# Patient Record
Sex: MALE | Race: WHITE | Employment: OTHER | URBAN - METROPOLITAN AREA
[De-identification: names, ages, dates, MRNs, and addresses within clinical notes are randomized per-mention and may not be internally consistent; named-entity substitution may affect disease eponyms.]

---

## 2017-02-17 RX ORDER — LISINOPRIL 20 MG/1
20 TABLET ORAL
Qty: 30 TABLET | Refills: 1 | Status: SHIPPED | OUTPATIENT
Start: 2017-02-17 | End: 2017-04-17

## 2017-04-17 RX ORDER — LISINOPRIL 20 MG/1
20 TABLET ORAL
Qty: 90 TABLET | Refills: 0 | Status: SHIPPED | OUTPATIENT
Start: 2017-04-17 | End: 2017-07-17

## 2017-04-17 NOTE — TELEPHONE ENCOUNTER
Did not pass protocol pt due for OV would you like pt to schedule OV. Please advise. Per protocol routed to provider.

## 2017-04-20 ENCOUNTER — TELEPHONE (OUTPATIENT)
Dept: INTERNAL MEDICINE CLINIC | Facility: CLINIC | Age: 62
End: 2017-04-20

## 2017-05-08 RX ORDER — FAMOTIDINE 20 MG/1
20 TABLET ORAL 2 TIMES DAILY
Qty: 180 TABLET | Refills: 0 | Status: SHIPPED | OUTPATIENT
Start: 2017-05-08 | End: 2017-08-26

## 2017-05-17 RX ORDER — SIMVASTATIN 20 MG
TABLET ORAL
Qty: 90 TABLET | Refills: 0 | Status: SHIPPED | OUTPATIENT
Start: 2017-05-17 | End: 2017-08-22

## 2017-06-05 ENCOUNTER — TELEPHONE (OUTPATIENT)
Dept: INTERNAL MEDICINE CLINIC | Facility: CLINIC | Age: 62
End: 2017-06-05

## 2017-07-13 ENCOUNTER — PRIOR ORIGINAL RECORDS (OUTPATIENT)
Dept: OTHER | Age: 62
End: 2017-07-13

## 2017-07-14 LAB
ALKALINE PHOSPHATATE(ALK PHOS): 108 IU/L
BILIRUBIN TOTAL: 0.4 MG/DL
BUN: 20 MG/DL
CALCIUM: 9.5 MG/DL
CHLORIDE: 107 MEQ/L
CHOLESTEROL, TOTAL: 123 MG/DL
CREATININE, SERUM: 1.38 MG/DL
GLUCOSE: 86 MG/DL
HDL CHOLESTEROL: 43 MG/DL
LDL CHOLESTEROL: 56 MG/DL
POTASSIUM, SERUM: 4.3 MEQ/L
PROTEIN, TOTAL: 7.4 G/DL
SGOT (AST): 14 IU/L
SGPT (ALT): 14 IU/L
SODIUM: 142 MEQ/L
TRIGLYCERIDES: 121 MG/DL

## 2017-07-17 RX ORDER — LISINOPRIL 20 MG/1
20 TABLET ORAL
Qty: 90 TABLET | Refills: 0 | Status: SHIPPED | OUTPATIENT
Start: 2017-07-17 | End: 2017-10-16

## 2017-07-17 NOTE — TELEPHONE ENCOUNTER
Per protocol; failed - route to provider  Patient is due for repeat fasting labs, Last completed a year ago   Physical coming up 7/19/17 SD

## 2017-07-18 ENCOUNTER — TELEPHONE (OUTPATIENT)
Dept: INTERNAL MEDICINE CLINIC | Facility: CLINIC | Age: 62
End: 2017-07-18

## 2017-07-19 ENCOUNTER — OFFICE VISIT (OUTPATIENT)
Dept: INTERNAL MEDICINE CLINIC | Facility: CLINIC | Age: 62
End: 2017-07-19

## 2017-07-19 ENCOUNTER — LAB ENCOUNTER (OUTPATIENT)
Dept: LAB | Age: 62
End: 2017-07-19
Attending: NURSE PRACTITIONER
Payer: MEDICARE

## 2017-07-19 VITALS
HEART RATE: 64 BPM | SYSTOLIC BLOOD PRESSURE: 118 MMHG | BODY MASS INDEX: 36 KG/M2 | WEIGHT: 230 LBS | DIASTOLIC BLOOD PRESSURE: 70 MMHG

## 2017-07-19 DIAGNOSIS — I77.9 BILATERAL CAROTID ARTERY DISEASE (HCC): ICD-10-CM

## 2017-07-19 DIAGNOSIS — I69.30 HISTORY OF CVA WITH RESIDUAL DEFICIT: ICD-10-CM

## 2017-07-19 DIAGNOSIS — Z12.11 SCREEN FOR COLON CANCER: ICD-10-CM

## 2017-07-19 DIAGNOSIS — I73.9 PERIPHERAL VASCULAR DISEASE, UNSPECIFIED (HCC): ICD-10-CM

## 2017-07-19 DIAGNOSIS — D64.9 ANEMIA, UNSPECIFIED: ICD-10-CM

## 2017-07-19 DIAGNOSIS — Z87.39 HISTORY OF GOUT: ICD-10-CM

## 2017-07-19 DIAGNOSIS — Z79.01 CHRONIC ANTICOAGULATION: ICD-10-CM

## 2017-07-19 DIAGNOSIS — I10 ESSENTIAL HYPERTENSION: ICD-10-CM

## 2017-07-19 DIAGNOSIS — Z87.01 HISTORY OF PNEUMONIA: ICD-10-CM

## 2017-07-19 DIAGNOSIS — E78.00 PURE HYPERCHOLESTEROLEMIA: ICD-10-CM

## 2017-07-19 DIAGNOSIS — R47.01 APHASIA: ICD-10-CM

## 2017-07-19 DIAGNOSIS — Z00.00 ENCOUNTER FOR ANNUAL HEALTH EXAMINATION: Primary | ICD-10-CM

## 2017-07-19 DIAGNOSIS — M19.071 ARTHRITIS OF ANKLE, RIGHT: ICD-10-CM

## 2017-07-19 DIAGNOSIS — Z91.81 AT RISK FOR FALLING: ICD-10-CM

## 2017-07-19 DIAGNOSIS — Z86.79 HISTORY OF VENTRICULAR TACHYCARDIA: ICD-10-CM

## 2017-07-19 DIAGNOSIS — Q21.1 OSTIUM SECUNDUM TYPE ATRIAL SEPTAL DEFECT: ICD-10-CM

## 2017-07-19 DIAGNOSIS — F41.9 ANXIETY: ICD-10-CM

## 2017-07-19 DIAGNOSIS — N18.30 CKD (CHRONIC KIDNEY DISEASE) STAGE 3, GFR 30-59 ML/MIN (HCC): ICD-10-CM

## 2017-07-19 LAB
BASOPHILS # BLD AUTO: 0.04 X10(3) UL (ref 0–0.1)
BASOPHILS NFR BLD AUTO: 0.4 %
EOSINOPHIL # BLD AUTO: 0.24 X10(3) UL (ref 0–0.3)
EOSINOPHIL NFR BLD AUTO: 2.6 %
ERYTHROCYTE [DISTWIDTH] IN BLOOD BY AUTOMATED COUNT: 12.4 % (ref 11.5–16)
HCT VFR BLD AUTO: 43.5 % (ref 37–53)
HGB BLD-MCNC: 13.9 G/DL (ref 13–17)
IMMATURE GRANULOCYTE COUNT: 0.04 X10(3) UL (ref 0–1)
IMMATURE GRANULOCYTE RATIO %: 0.4 %
LYMPHOCYTES # BLD AUTO: 2.99 X10(3) UL (ref 0.9–4)
LYMPHOCYTES NFR BLD AUTO: 32.2 %
MCH RBC QN AUTO: 30 PG (ref 27–33.2)
MCHC RBC AUTO-ENTMCNC: 32 G/DL (ref 31–37)
MCV RBC AUTO: 93.8 FL (ref 80–99)
MONOCYTES # BLD AUTO: 0.78 X10(3) UL (ref 0.1–0.6)
MONOCYTES NFR BLD AUTO: 8.4 %
NEUTROPHIL ABS PRELIM: 5.19 X10 (3) UL (ref 1.3–6.7)
NEUTROPHILS # BLD AUTO: 5.19 X10(3) UL (ref 1.3–6.7)
NEUTROPHILS NFR BLD AUTO: 56 %
PLATELET # BLD AUTO: 231 10(3)UL (ref 150–450)
RBC # BLD AUTO: 4.64 X10(6)UL (ref 4.3–5.7)
RED CELL DISTRIBUTION WIDTH-SD: 43 FL (ref 35.1–46.3)
WBC # BLD AUTO: 9.3 X10(3) UL (ref 4–13)

## 2017-07-19 PROCEDURE — 99214 OFFICE O/P EST MOD 30 MIN: CPT | Performed by: NURSE PRACTITIONER

## 2017-07-19 PROCEDURE — 90732 PPSV23 VACC 2 YRS+ SUBQ/IM: CPT | Performed by: NURSE PRACTITIONER

## 2017-07-19 PROCEDURE — 85025 COMPLETE CBC W/AUTO DIFF WBC: CPT

## 2017-07-19 PROCEDURE — G0009 ADMIN PNEUMOCOCCAL VACCINE: HCPCS | Performed by: NURSE PRACTITIONER

## 2017-07-19 PROCEDURE — G0439 PPPS, SUBSEQ VISIT: HCPCS | Performed by: NURSE PRACTITIONER

## 2017-07-19 RX ORDER — CLOTRIMAZOLE AND BETAMETHASONE DIPROPIONATE 10; .64 MG/G; MG/G
1 CREAM TOPICAL 2 TIMES DAILY
Qty: 60 G | Refills: 3 | Status: SHIPPED | OUTPATIENT
Start: 2017-07-19 | End: 2018-08-09 | Stop reason: ALTCHOICE

## 2017-07-19 RX ORDER — ASPIRIN 81 MG/1
81 TABLET ORAL DAILY
COMMUNITY

## 2017-07-19 NOTE — PROGRESS NOTES
HPI:   Rosa Jameson is a 58year old male who presents for a Medicare Subsequent Annual Wellness visit (Pt already had Initial Annual Wellness). He is doing very well today. Expressive.          Anemia, unspecified   Cbc not done with other labs from [facility])     Ostium secundum type atrial septal defect     Pure hypercholesterolemia     Essential hypertension     Chronic anticoagulation     History of ventricular tachycardia     At risk for falling     Aphasia     Arthritis of ankle, right     CKD (chron gain; Acute, but ill-defined, cerebrovascular disease (4/2009, 11/2008); CVA, old, aphasia (4/2009); Edema; Essential hypertension; Factor 5 Leiden mutation, [facility] HTN (hypertension); Hypercholesteremia;  Hyperlipidemia; PFO (patent foramen ov Symmetric, no curvature, ROM normal, no CVA tenderness   Lungs:   Clear to auscultation bilaterally, respirations unlabored   Chest Wall:  No tenderness or deformity   Heart:  Regular rate and rhythm, S1, S2 normal,    Abdomen:   Soft, non-tender, bowel so ml/min  Stable. Creat 1.38 baseline. History of pneumonia   Pneumovax today    History of gout  Uric acid normal on recent labs. History of CVA with residual deficit   Stable. Bilateral carotid artery disease (HCC)  Stable  Cont statin. help    Taking medications as prescribed: Cannot do without help    Are you able to afford your medications?: Yes    Hearing Problems?: No     Functional Status     Hearing Problems?: No    Vision Problems? : No    Difficulty walking?: Yes    Difficulty  (mg/dL (calc))   Date Value   05/18/2016 53        EKG - w/ Initial Preventative Physical Exam only, or if medically necessary Electrocardiogram date10/12/2015    Colorectal Cancer Screening      Colonoscopy Screen every 10 years Colonoscopy,10 Years due o found for: A1C    No flowsheet data found. Creat/alb ratio  Annually      LDL  Annually LDL-CHOLESTEROL (mg/dL (calc))   Date Value   05/18/2016 53    No flowsheet data found. Dilated Eye exam  Annually No flowsheet data found.   No flowsheet data fo

## 2017-07-19 NOTE — PATIENT INSTRUCTIONS
Cathy Braun's SCREENING SCHEDULE   Tests on this list are recommended by your physician but may not be covered, or covered at this frequency, by your insurer. Please check with your insurance carrier before scheduling to verify coverage.     PREVENTATI years- more often if abnormal Colonoscopy,10 Years due on 06/13/2005 Update [facility] if applicable    Flex Sigmoidoscopy Screen  Covered every 5 years No results found for this or any previous visit. No flowsheet data found.      Fecal Occult Bloo DIPHTHERIA TOXOIDS AND ACELLULAR PERTUSIS VACCINE (TDAP), >7 YEARS, IM USE    This may be covered with your prescription benefits, but Medicare does not cover unless Medically needed    Zoster (Not covered by Medicare Part B) No orders found for this or an

## 2017-08-22 RX ORDER — SIMVASTATIN 20 MG
TABLET ORAL
Qty: 90 TABLET | Refills: 1 | Status: SHIPPED | OUTPATIENT
Start: 2017-08-22 | End: 2018-02-20

## 2017-08-25 ENCOUNTER — APPOINTMENT (OUTPATIENT)
Dept: LAB | Facility: HOSPITAL | Age: 62
End: 2017-08-25
Attending: NURSE PRACTITIONER
Payer: MEDICARE

## 2017-08-25 DIAGNOSIS — Z12.11 SCREEN FOR COLON CANCER: ICD-10-CM

## 2017-08-25 PROCEDURE — 82272 OCCULT BLD FECES 1-3 TESTS: CPT

## 2017-08-28 RX ORDER — FAMOTIDINE 20 MG/1
TABLET ORAL
Qty: 180 TABLET | Refills: 0 | Status: SHIPPED | OUTPATIENT
Start: 2017-08-28 | End: 2017-11-27

## 2017-09-06 ENCOUNTER — PRIOR ORIGINAL RECORDS (OUTPATIENT)
Dept: OTHER | Age: 62
End: 2017-09-06

## 2017-10-16 RX ORDER — LISINOPRIL 20 MG/1
20 TABLET ORAL
Qty: 90 TABLET | Refills: 1 | Status: SHIPPED | OUTPATIENT
Start: 2017-10-16 | End: 2018-04-14

## 2017-11-27 RX ORDER — FAMOTIDINE 20 MG/1
TABLET ORAL
Qty: 180 TABLET | Refills: 0 | Status: SHIPPED | OUTPATIENT
Start: 2017-11-27 | End: 2018-02-20

## 2018-02-20 RX ORDER — SIMVASTATIN 20 MG
TABLET ORAL
Qty: 90 TABLET | Refills: 1 | Status: SHIPPED | OUTPATIENT
Start: 2018-02-20 | End: 2018-08-16

## 2018-02-20 RX ORDER — FAMOTIDINE 20 MG/1
TABLET ORAL
Qty: 180 TABLET | Refills: 1 | Status: SHIPPED | OUTPATIENT
Start: 2018-02-20 | End: 2018-08-16

## 2018-02-20 NOTE — TELEPHONE ENCOUNTER
[facility] is calling to check the status of a refill on the following:  SIMVASTATIN 20 MG Oral Tab 90 tablet 1 8/22/2017    Sig :  TAKE ONE TABLET BY MOUTH ONE TIME DAILY IN THE EVENING.      Route:   (none)       And    FAMOTIDINE 20 MG Oral Tab 180 tablet 0 11/

## 2018-02-20 NOTE — TELEPHONE ENCOUNTER
Per protocol; failed - route to provider  Review for refill - due for office visit and repeat Lipid , ALT abnormal

## 2018-04-16 RX ORDER — LISINOPRIL 20 MG/1
20 TABLET ORAL
Qty: 90 TABLET | Refills: 1 | Status: SHIPPED | OUTPATIENT
Start: 2018-04-16 | End: 2018-10-11

## 2018-04-16 NOTE — TELEPHONE ENCOUNTER
Medication(s) to Refill:   Pending Prescriptions Disp Refills    LISINOPRIL 20 MG Oral Tab [Pharmacy Med Name: LISINOPRIL 20 MG TABLET] 90 tablet 0     Sig: TAKE 1 TABLET (20 MG TOTAL) BY MOUTH ONCE DAILY. Reason for Medication Refill being sent to Provider / Reason Protocol Failed:  [x] 90 day bobby period ended  [] Blood Pressure out of range  [x] Labs Abnormal  [] Medication not previously prescribed by Provider  [] Non-Protocol Medication  [] Prescription needs to be printed at site and faxed to pharmacy  [] Controlled Substance - needs to be printed at the site, site to notify patient when ready    Last Time Medication was Filled: 10/16/2017 (90 tab with 1 refill )       Last Office Visit with PCP: 7/19/2017    When Patient was Due Back to the Office: Return in about 1 year around 7/19/2018  (from when PCP last addressed condition)    Future Appointments:  No future appointments.       Last Blood Pressures:  BP Readings from Last 2 Encounters:  07/19/17 : 118/70  07/13/16 : 120/76

## 2018-04-23 ENCOUNTER — TELEPHONE (OUTPATIENT)
Dept: INTERNAL MEDICINE CLINIC | Facility: CLINIC | Age: 63
End: 2018-04-23

## 2018-05-11 ENCOUNTER — TELEPHONE (OUTPATIENT)
Dept: INTERNAL MEDICINE CLINIC | Facility: CLINIC | Age: 63
End: 2018-05-11

## 2018-05-14 NOTE — TELEPHONE ENCOUNTER
Forms has been filled out,patient still due to sign forms. Left voicemail to see if patient would prefer to  forms or mail to their home.

## 2018-05-21 ENCOUNTER — TELEPHONE (OUTPATIENT)
Dept: INTERNAL MEDICINE CLINIC | Facility: CLINIC | Age: 63
End: 2018-05-21

## 2018-07-13 ENCOUNTER — TELEPHONE (OUTPATIENT)
Dept: INTERNAL MEDICINE CLINIC | Facility: CLINIC | Age: 63
End: 2018-07-13

## 2018-07-13 NOTE — TELEPHONE ENCOUNTER
Spoke to [place] and she would like the forms filled out prior to his appt she states that in the paper work that she filled out she wrote that he has an appt in Aug 2018

## 2018-08-09 ENCOUNTER — OFFICE VISIT (OUTPATIENT)
Dept: INTERNAL MEDICINE CLINIC | Facility: CLINIC | Age: 63
End: 2018-08-09
Payer: MEDICARE

## 2018-08-09 VITALS
RESPIRATION RATE: 16 BRPM | WEIGHT: 235 LBS | HEART RATE: 68 BPM | DIASTOLIC BLOOD PRESSURE: 78 MMHG | SYSTOLIC BLOOD PRESSURE: 138 MMHG | BODY MASS INDEX: 37 KG/M2

## 2018-08-09 DIAGNOSIS — Z86.79 HISTORY OF VENTRICULAR TACHYCARDIA: ICD-10-CM

## 2018-08-09 DIAGNOSIS — Z12.5 SCREENING FOR PROSTATE CANCER: ICD-10-CM

## 2018-08-09 DIAGNOSIS — F41.9 ANXIETY: ICD-10-CM

## 2018-08-09 DIAGNOSIS — I69.30 HISTORY OF CVA WITH RESIDUAL DEFICIT: ICD-10-CM

## 2018-08-09 DIAGNOSIS — I73.9 PERIPHERAL VASCULAR DISEASE, UNSPECIFIED (HCC): ICD-10-CM

## 2018-08-09 DIAGNOSIS — Z87.39 HISTORY OF GOUT: ICD-10-CM

## 2018-08-09 DIAGNOSIS — Z00.00 ENCOUNTER FOR ANNUAL HEALTH EXAMINATION: Primary | ICD-10-CM

## 2018-08-09 DIAGNOSIS — Z79.01 CHRONIC ANTICOAGULATION: ICD-10-CM

## 2018-08-09 DIAGNOSIS — R60.0 BILATERAL LEG EDEMA: ICD-10-CM

## 2018-08-09 DIAGNOSIS — E78.00 PURE HYPERCHOLESTEROLEMIA: ICD-10-CM

## 2018-08-09 DIAGNOSIS — Z91.81 AT RISK FOR FALLING: ICD-10-CM

## 2018-08-09 DIAGNOSIS — R47.01 APHASIA: ICD-10-CM

## 2018-08-09 DIAGNOSIS — Q21.1 OSTIUM SECUNDUM TYPE ATRIAL SEPTAL DEFECT: ICD-10-CM

## 2018-08-09 DIAGNOSIS — D64.9 ANEMIA, UNSPECIFIED TYPE: ICD-10-CM

## 2018-08-09 DIAGNOSIS — N18.30 CKD (CHRONIC KIDNEY DISEASE) STAGE 3, GFR 30-59 ML/MIN (HCC): ICD-10-CM

## 2018-08-09 DIAGNOSIS — I65.23 BILATERAL CAROTID ARTERY STENOSIS: ICD-10-CM

## 2018-08-09 DIAGNOSIS — Z87.01 HISTORY OF PNEUMONIA: ICD-10-CM

## 2018-08-09 DIAGNOSIS — I10 ESSENTIAL HYPERTENSION: ICD-10-CM

## 2018-08-09 PROCEDURE — 99214 OFFICE O/P EST MOD 30 MIN: CPT | Performed by: NURSE PRACTITIONER

## 2018-08-09 PROCEDURE — G0439 PPPS, SUBSEQ VISIT: HCPCS | Performed by: NURSE PRACTITIONER

## 2018-08-09 RX ORDER — FUROSEMIDE 20 MG/1
20 TABLET ORAL DAILY
Qty: 5 TABLET | Refills: 0 | Status: SHIPPED | OUTPATIENT
Start: 2018-08-09 | End: 2019-09-12 | Stop reason: ALTCHOICE

## 2018-08-09 RX ORDER — POTASSIUM CHLORIDE 20 MEQ/1
20 TABLET, EXTENDED RELEASE ORAL DAILY
Qty: 5 TABLET | Refills: 0 | Status: SHIPPED | OUTPATIENT
Start: 2018-08-09 | End: 2019-10-16

## 2018-08-09 NOTE — PROGRESS NOTES
HPI:   Joce Oseguera is a 61year old male who presents for a Medicare Subsequent Annual Wellness visit (Pt already had Initial Annual Wellness). Anemia, unspecified type  Stable on most recenet cbc   Due for labs.    -     CBC WITH DIFFERENTIAL WITH increase seroquel.        His last annual assessment has been over 1 year: Annual Physical due on 07/19/2018          Fall/Risk Assessment   He has been screened for Falls and is low risk: Fall/Risk Scoring: 3    Cognitive Assessment          His results ar Advanced Directive:   He does NOT have a Living Will on file in [facility] [address] Not Discussed       He does have a POA but we do NOT have it on file in [facility] [address] Not Discussed           He smoked tobacco in the past but quit greater than 12 months ago.   Smok No Known Allergies. CURRENT MEDICATIONS:     Outpatient Prescriptions Marked as Taking for the 8/9/18 encounter (Office Visit) with SHERIF Joy:  furosemide (LASIX) 20 MG Oral Tab Take 1 tablet (20 mg total) by mouth daily.    Potassium Chloride E Negative for fever, chills and fatigue. No distress. HENT: Negative for hearing loss, congestion, sore throat, neck pain and dental problem. Eyes: Negative for pain and visual disturbance.    Respiratory: Negative for cough, chest tightness, shortness o Vaccination History     Immunization History   Administered Date(s) Administered   • Influenza 11/28/2012   • Pneumovax 23 07/19/2017   • TDAP 11/28/2012   • Technetium Tc99mm Sestamibi, Iv, Up To 40 Millicuries 55/98/0424        ASSESSMENT AND OTHER REL be worse with warmer weather. Will give a few doses of lasix   Family defer further work up   Will discuss further wth Dr Andrzej Chou if persistent. Other orders  -     furosemide (LASIX) 20 MG Oral Tab; Take 1 tablet (20 mg total) by mouth daily.   -     Po Visit Annually: Diabetics, FHx Glaucoma, AA>50, > 65 No flowsheet data found.     Prostate Cancer Screening      PSA  Annually PSA due on 10/05/2013  Update Health Maintenance if applicable     Immunizations (Update Immunization Activity if applicab

## 2018-08-09 NOTE — PATIENT INSTRUCTIONS
Jose Braun's SCREENING SCHEDULE   Tests on this list are recommended by your physician but may not be covered, or covered at this frequency, by your insurer. Please check with your insurance carrier before scheduling to verify coverage.     PREVENTATI years- more often if abnormal Colonoscopy due on 06/13/1955 Update [facility] if applicable    Flex Sigmoidoscopy Screen  Covered every 5 years No results found for this or any previous visit. No flowsheet data found.      Fecal Occult Blood   Cover Tetanus Toxoid- Only covered with a cut with metal- TD and TDaP Not covered by Medicare Part B)   Orders placed or performed in visit on 11/28/12  -TETANUS, DIPHTHERIA TOXOIDS AND ACELLULAR PERTUSIS VACCINE (TDAP), >7 YEARS, IM USE    This may be covered w

## 2018-08-16 RX ORDER — FAMOTIDINE 20 MG/1
TABLET, FILM COATED ORAL
Qty: 180 TABLET | Refills: 1 | Status: SHIPPED | OUTPATIENT
Start: 2018-08-16 | End: 2019-02-11

## 2018-08-16 RX ORDER — SIMVASTATIN 20 MG
TABLET ORAL
Qty: 90 TABLET | Refills: 0 | Status: SHIPPED | OUTPATIENT
Start: 2018-08-16 | End: 2018-11-23

## 2018-08-16 NOTE — TELEPHONE ENCOUNTER
Did not pass protocol-pt due for CMP and LIPID  Pt seen on 8/9/18 for CPE and labs were ordered  Per protocol routed to provider

## 2018-09-26 ENCOUNTER — LAB ENCOUNTER (OUTPATIENT)
Dept: LAB | Age: 63
End: 2018-09-26
Attending: NURSE PRACTITIONER
Payer: MEDICARE

## 2018-09-26 ENCOUNTER — PRIOR ORIGINAL RECORDS (OUTPATIENT)
Dept: OTHER | Age: 63
End: 2018-09-26

## 2018-09-26 DIAGNOSIS — E78.00 PURE HYPERCHOLESTEROLEMIA: ICD-10-CM

## 2018-09-26 DIAGNOSIS — D64.9 ANEMIA, UNSPECIFIED TYPE: ICD-10-CM

## 2018-09-26 DIAGNOSIS — I10 ESSENTIAL HYPERTENSION: ICD-10-CM

## 2018-09-26 DIAGNOSIS — Z12.5 SCREENING FOR PROSTATE CANCER: ICD-10-CM

## 2018-09-26 DIAGNOSIS — I63.9 IMPENDING CEREBROVASCULAR ACCIDENT (HCC): Primary | ICD-10-CM

## 2018-09-26 LAB
ALBUMIN SERPL-MCNC: 4.1 G/DL (ref 3.5–4.8)
ALBUMIN/GLOB SERPL: 1.1 {RATIO} (ref 1–2)
ALP LIVER SERPL-CCNC: 112 U/L (ref 45–117)
ALT SERPL-CCNC: 25 U/L (ref 17–63)
ANION GAP SERPL CALC-SCNC: 8 MMOL/L (ref 0–18)
AST SERPL-CCNC: 14 U/L (ref 15–41)
BASOPHILS # BLD AUTO: 0.04 X10(3) UL (ref 0–0.1)
BASOPHILS NFR BLD AUTO: 0.5 %
BILIRUB SERPL-MCNC: 0.5 MG/DL (ref 0.1–2)
BUN BLD-MCNC: 18 MG/DL (ref 8–20)
BUN/CREAT SERPL: 13.2 (ref 10–20)
CALCIUM BLD-MCNC: 9.3 MG/DL (ref 8.3–10.3)
CHLORIDE SERPL-SCNC: 104 MMOL/L (ref 101–111)
CHOLEST SMN-MCNC: 108 MG/DL (ref ?–200)
CO2 SERPL-SCNC: 27 MMOL/L (ref 22–32)
COMPLEXED PSA SERPL-MCNC: 4.08 NG/ML (ref 0.01–4)
CREAT BLD-MCNC: 1.36 MG/DL (ref 0.7–1.3)
EOSINOPHIL # BLD AUTO: 0.25 X10(3) UL (ref 0–0.3)
EOSINOPHIL NFR BLD AUTO: 3.1 %
ERYTHROCYTE [DISTWIDTH] IN BLOOD BY AUTOMATED COUNT: 12.5 % (ref 11.5–16)
GLOBULIN PLAS-MCNC: 3.7 G/DL (ref 2.5–4)
GLUCOSE BLD-MCNC: 84 MG/DL (ref 70–99)
HCT VFR BLD AUTO: 44.6 % (ref 37–53)
HDLC SERPL-MCNC: 38 MG/DL (ref 40–59)
HGB BLD-MCNC: 14.1 G/DL (ref 13–17)
IMMATURE GRANULOCYTE COUNT: 0.02 X10(3) UL (ref 0–1)
IMMATURE GRANULOCYTE RATIO %: 0.2 %
INR BLD: 2.04 (ref 0.9–1.1)
INR: 2.04
LDLC SERPL CALC-MCNC: 50 MG/DL (ref ?–100)
LYMPHOCYTES # BLD AUTO: 3.04 X10(3) UL (ref 0.9–4)
LYMPHOCYTES NFR BLD AUTO: 38 %
M PROTEIN MFR SERPL ELPH: 7.8 G/DL (ref 6.1–8.3)
MCH RBC QN AUTO: 30.1 PG (ref 27–33.2)
MCHC RBC AUTO-ENTMCNC: 31.6 G/DL (ref 31–37)
MCV RBC AUTO: 95.3 FL (ref 80–99)
MONOCYTES # BLD AUTO: 0.63 X10(3) UL (ref 0.1–1)
MONOCYTES NFR BLD AUTO: 7.9 %
NEUTROPHIL ABS PRELIM: 4.03 X10 (3) UL (ref 1.3–6.7)
NEUTROPHILS # BLD AUTO: 4.03 X10(3) UL (ref 1.3–6.7)
NEUTROPHILS NFR BLD AUTO: 50.3 %
NONHDLC SERPL-MCNC: 70 MG/DL (ref ?–130)
OSMOLALITY SERPL CALC.SUM OF ELEC: 289 MOSM/KG (ref 275–295)
PLATELET # BLD AUTO: 201 10(3)UL (ref 150–450)
POTASSIUM SERPL-SCNC: 4.2 MMOL/L (ref 3.6–5.1)
PSA SERPL DL<=0.01 NG/ML-MCNC: 23.7 SECONDS (ref 12.4–14.7)
RBC # BLD AUTO: 4.68 X10(6)UL (ref 4.3–5.7)
RED CELL DISTRIBUTION WIDTH-SD: 44.1 FL (ref 35.1–46.3)
SODIUM SERPL-SCNC: 139 MMOL/L (ref 136–144)
TRIGL SERPL-MCNC: 99 MG/DL (ref 30–149)
TSI SER-ACNC: 1.44 MIU/ML (ref 0.35–5.5)
VLDLC SERPL CALC-MCNC: 20 MG/DL (ref 0–30)
WBC # BLD AUTO: 8 X10(3) UL (ref 4–13)

## 2018-09-26 PROCEDURE — 85025 COMPLETE CBC W/AUTO DIFF WBC: CPT

## 2018-09-26 PROCEDURE — 80061 LIPID PANEL: CPT

## 2018-09-26 PROCEDURE — 36415 COLL VENOUS BLD VENIPUNCTURE: CPT

## 2018-09-26 PROCEDURE — 84443 ASSAY THYROID STIM HORMONE: CPT

## 2018-09-26 PROCEDURE — 85610 PROTHROMBIN TIME: CPT

## 2018-09-26 PROCEDURE — 80053 COMPREHEN METABOLIC PANEL: CPT

## 2018-09-27 ENCOUNTER — TELEPHONE (OUTPATIENT)
Dept: INTERNAL MEDICINE CLINIC | Facility: CLINIC | Age: 63
End: 2018-09-27

## 2018-09-27 DIAGNOSIS — R97.20 ELEVATED PSA: Primary | ICD-10-CM

## 2018-09-27 NOTE — TELEPHONE ENCOUNTER
Pt sister Kaushik Herrera calling back to discuss pt's lab results and recommendations. Keven Mario that patient's PSA elevated and SD recommends seeing Urology.  Kaushik Herrera agreeable states name written down was Dr. Romero Gilliland but prefer not to use [facility] and wanted recomme

## 2018-10-10 ENCOUNTER — PRIOR ORIGINAL RECORDS (OUTPATIENT)
Dept: OTHER | Age: 63
End: 2018-10-10

## 2018-10-10 ENCOUNTER — MYAURORA ACCOUNT LINK (OUTPATIENT)
Dept: OTHER | Age: 63
End: 2018-10-10

## 2018-10-10 LAB
ALBUMIN: 4.1 G/DL
ALKALINE PHOSPHATATE(ALK PHOS): 112 IU/L
ALT (SGPT): 25 U/L
AST (SGOT): 14 U/L
BILIRUBIN TOTAL: 0.5 MG/DL
BUN: 18 MG/DL
CALCIUM: 9.3 MG/DL
CHLORIDE: 104 MEQ/L
CHOLESTEROL, TOTAL: 108 MG/DL
CREATININE, SERUM: 1.36 MG/DL
GLUCOSE: 84 MG/DL
GLUCOSE: 84 MG/DL
HDL CHOLESTEROL: 38 MG/DL
HEMATOCRIT: 44.6 %
HEMOGLOBIN: 14.1 G/DL
LDL CHOLESTEROL: 50 MG/DL
MCH: 30.1 PG
MCHC: 31.6 G/DL
MCV: 95.3 FL
NON-HDL CHOLESTEROL: 70 MG/DL
PLATELETS: 201 K/UL
POTASSIUM, SERUM: 4.2 MEQ/L
PROTEIN, TOTAL: 7.8 G/DL
RED BLOOD COUNT: 4.68 X 10-6/U
SGOT (AST): 14 IU/L
SGPT (ALT): 25 IU/L
SODIUM: 139 MEQ/L
TRIGLYCERIDES: 99 MG/DL
WHITE BLOOD COUNT: 8 X 10-3/U

## 2018-10-11 RX ORDER — LISINOPRIL 20 MG/1
20 TABLET ORAL
Qty: 90 TABLET | Refills: 1 | Status: SHIPPED | OUTPATIENT
Start: 2018-10-11 | End: 2019-04-07

## 2018-10-24 ENCOUNTER — PRIOR ORIGINAL RECORDS (OUTPATIENT)
Dept: OTHER | Age: 63
End: 2018-10-24

## 2018-10-24 LAB — INR: 2.3

## 2018-11-01 ENCOUNTER — PRIOR ORIGINAL RECORDS (OUTPATIENT)
Dept: OTHER | Age: 63
End: 2018-11-01

## 2018-11-01 LAB — INR: 2

## 2018-11-02 ENCOUNTER — MED REC SCAN ONLY (OUTPATIENT)
Dept: INTERNAL MEDICINE CLINIC | Facility: CLINIC | Age: 63
End: 2018-11-02

## 2018-11-08 ENCOUNTER — PRIOR ORIGINAL RECORDS (OUTPATIENT)
Dept: OTHER | Age: 63
End: 2018-11-08

## 2018-11-08 LAB — INR: 2.3

## 2018-11-24 RX ORDER — SIMVASTATIN 20 MG
TABLET ORAL
Qty: 90 TABLET | Refills: 0 | Status: SHIPPED | OUTPATIENT
Start: 2018-11-24 | End: 2019-02-25

## 2018-12-05 ENCOUNTER — PRIOR ORIGINAL RECORDS (OUTPATIENT)
Dept: OTHER | Age: 63
End: 2018-12-05

## 2018-12-05 LAB — INR: 2.2

## 2018-12-18 ENCOUNTER — PRIOR ORIGINAL RECORDS (OUTPATIENT)
Dept: OTHER | Age: 63
End: 2018-12-18

## 2018-12-18 LAB — INR: 2.5

## 2018-12-31 ENCOUNTER — PRIOR ORIGINAL RECORDS (OUTPATIENT)
Dept: OTHER | Age: 63
End: 2018-12-31

## 2018-12-31 LAB — INR: 2.5

## 2019-01-09 ENCOUNTER — PRIOR ORIGINAL RECORDS (OUTPATIENT)
Dept: OTHER | Age: 64
End: 2019-01-09

## 2019-01-09 LAB — INR: 2.4

## 2019-01-10 ENCOUNTER — HOSPITAL ENCOUNTER (OUTPATIENT)
Age: 64
Discharge: HOME | End: 2019-01-10
Payer: MEDICARE

## 2019-01-10 VITALS
HEART RATE: 82 BPM | TEMPERATURE: 100 F | OXYGEN SATURATION: 98 % | DIASTOLIC BLOOD PRESSURE: 99 MMHG | SYSTOLIC BLOOD PRESSURE: 126 MMHG | RESPIRATION RATE: 20 BRPM

## 2019-01-10 DIAGNOSIS — L03.317 CELLULITIS OF BUTTOCK: Primary | ICD-10-CM

## 2019-01-10 PROCEDURE — 99204 OFFICE O/P NEW MOD 45 MIN: CPT

## 2019-01-10 PROCEDURE — 99213 OFFICE O/P EST LOW 20 MIN: CPT

## 2019-01-10 RX ORDER — PREDNISONE 10 MG/1
TABLET ORAL
Qty: 20 TABLET | Refills: 0 | Status: SHIPPED | OUTPATIENT
Start: 2019-01-10 | End: 2019-09-12 | Stop reason: ALTCHOICE

## 2019-01-10 RX ORDER — TRIAMTERENE AND HYDROCHLOROTHIAZIDE 37.5; 25 MG/1; MG/1
1 TABLET ORAL DAILY
COMMUNITY
End: 2019-10-16

## 2019-01-10 RX ORDER — CEFADROXIL 500 MG/1
500 CAPSULE ORAL 2 TIMES DAILY
Qty: 20 CAPSULE | Refills: 0 | Status: SHIPPED | OUTPATIENT
Start: 2019-01-10 | End: 2019-01-20

## 2019-01-10 NOTE — ED INITIAL ASSESSMENT (HPI)
Patient is non-verbal and with mother and sister, per family patient has a bump/sore in between buttocks area for 2 weeks. Has been scratching it and sore is getting worse.

## 2019-01-10 NOTE — ED PROVIDER NOTES
Patient Seen in: [facility] [place]    History   Patient presents with:  Bump    Stated Complaint: sore on buttock poss infection    HPI    Trinidad Purpura is a 58-year-old male who presents with his mother and sister for evaluation of sore to t Positive for stated complaint: sore on buttock poss infection  Other systems are as noted in HPI. Constitutional and vital signs reviewed. All other systems reviewed and negative except as noted above.     Physical Exam     ED Triage Vitals [01/10/19 Disposition and Plan     Clinical Impression:  Cellulitis of buttock  (primary encounter diagnosis)    Disposition:  Discharge  1/10/2019 12:04 pm    Follow-up:  Latoya Levin, [address] [facility] [address]    In 3

## 2019-01-23 ENCOUNTER — PRIOR ORIGINAL RECORDS (OUTPATIENT)
Dept: OTHER | Age: 64
End: 2019-01-23

## 2019-01-23 LAB — INR: 2.4

## 2019-02-07 ENCOUNTER — PRIOR ORIGINAL RECORDS (OUTPATIENT)
Dept: OTHER | Age: 64
End: 2019-02-07

## 2019-02-07 LAB — INR: 2.9

## 2019-02-11 RX ORDER — FAMOTIDINE 20 MG/1
TABLET ORAL
Qty: 180 TABLET | Refills: 1 | Status: SHIPPED | OUTPATIENT
Start: 2019-02-11 | End: 2019-08-09

## 2019-02-11 NOTE — TELEPHONE ENCOUNTER
Protocol failed   Please advise,    LOV:8/9/18 SD  FOV:none on file  LAST RX:8/16/18 20 mg take 1 tab twice a day 180 tabs 1 refill   LAST LABS:9/26/18 pt,psa,tsh,lipids,cmp,cbc  PER PROTOCOL: to provider

## 2019-02-14 ENCOUNTER — PRIOR ORIGINAL RECORDS (OUTPATIENT)
Dept: OTHER | Age: 64
End: 2019-02-14

## 2019-02-14 LAB — INR: 2.6

## 2019-02-21 ENCOUNTER — PRIOR ORIGINAL RECORDS (OUTPATIENT)
Dept: OTHER | Age: 64
End: 2019-02-21

## 2019-02-21 LAB — INR: 2.4

## 2019-02-25 RX ORDER — SIMVASTATIN 20 MG
TABLET ORAL
Qty: 90 TABLET | Refills: 0 | Status: SHIPPED | OUTPATIENT
Start: 2019-02-25 | End: 2019-05-19

## 2019-02-28 VITALS
WEIGHT: 233 LBS | BODY MASS INDEX: 38.82 KG/M2 | SYSTOLIC BLOOD PRESSURE: 120 MMHG | DIASTOLIC BLOOD PRESSURE: 74 MMHG | HEART RATE: 64 BPM | HEIGHT: 65 IN

## 2019-02-28 VITALS
BODY MASS INDEX: 36.32 KG/M2 | HEIGHT: 65 IN | DIASTOLIC BLOOD PRESSURE: 80 MMHG | HEART RATE: 64 BPM | WEIGHT: 218 LBS | SYSTOLIC BLOOD PRESSURE: 126 MMHG

## 2019-02-28 LAB — INR PPP: 2.7

## 2019-03-05 ENCOUNTER — ANTI-COAG (OUTPATIENT)
Dept: CARDIOLOGY | Age: 64
End: 2019-03-05

## 2019-03-05 PROBLEM — I63.9 CEREBRAL INFARCTION (CMD): Status: ACTIVE | Noted: 2019-03-05

## 2019-03-06 ENCOUNTER — ANTI-COAG (OUTPATIENT)
Dept: CARDIOLOGY | Age: 64
End: 2019-03-06

## 2019-03-06 DIAGNOSIS — I63.9 CEREBRAL INFARCTION, UNSPECIFIED MECHANISM (CMD): ICD-10-CM

## 2019-03-06 LAB — INR PPP: 3.1

## 2019-03-11 ENCOUNTER — TELEPHONE (OUTPATIENT)
Dept: INTERNAL MEDICINE CLINIC | Facility: CLINIC | Age: 64
End: 2019-03-11

## 2019-03-13 ENCOUNTER — ANTI-COAG (OUTPATIENT)
Dept: CARDIOLOGY | Age: 64
End: 2019-03-13

## 2019-03-13 DIAGNOSIS — I63.9 CEREBRAL INFARCTION, UNSPECIFIED MECHANISM (CMD): ICD-10-CM

## 2019-03-13 LAB — INR PPP: 2.8

## 2019-03-18 NOTE — TELEPHONE ENCOUNTER
Spoke with Kar Frausto patient's sister-confirmed supplies needed, faxed [facility] signed supply order. Fax confirmation received.

## 2019-03-19 NOTE — TELEPHONE ENCOUNTER
Mirela devlin from [facility]. Order form sent to them is missing information; credentials next to signing APN's signature and they will also need a MD to add name and credentials and signature as well. Form is being faxed back to us. Return; Fax

## 2019-03-21 ENCOUNTER — ANTI-COAG (OUTPATIENT)
Dept: CARDIOLOGY | Age: 64
End: 2019-03-21

## 2019-03-21 DIAGNOSIS — I63.9 CEREBRAL INFARCTION, UNSPECIFIED MECHANISM (CMD): ICD-10-CM

## 2019-03-21 LAB — INR PPP: 2.4

## 2019-03-27 ENCOUNTER — ANTI-COAG (OUTPATIENT)
Dept: CARDIOLOGY | Age: 64
End: 2019-03-27

## 2019-03-27 DIAGNOSIS — I63.9 CEREBRAL INFARCTION, UNSPECIFIED MECHANISM (CMD): ICD-10-CM

## 2019-03-27 LAB — INR PPP: 2.2

## 2019-03-27 NOTE — TELEPHONE ENCOUNTER
Form faxed back stating the signing provider SD will need to date the previously added doctor credentials and overseeing doctors information to be added to the bottom, paperwork placed in SD's bin to be completed

## 2019-04-03 ENCOUNTER — ANTI-COAG (OUTPATIENT)
Dept: CARDIOLOGY | Age: 64
End: 2019-04-03

## 2019-04-03 DIAGNOSIS — I63.9 CEREBRAL INFARCTION, UNSPECIFIED MECHANISM (CMD): ICD-10-CM

## 2019-04-03 LAB — INR PPP: 2.5

## 2019-04-08 RX ORDER — LISINOPRIL 20 MG/1
20 TABLET ORAL
Qty: 90 TABLET | Refills: 1 | Status: SHIPPED | OUTPATIENT
Start: 2019-04-08 | End: 2019-10-02

## 2019-04-08 NOTE — TELEPHONE ENCOUNTER
LFV: 8/9/18 with SD  Future Appt: none on file  Last Rx:10/11/18 for 90 days w/1  Last Labs: 09/26/18 cpe labs stable  Per protocol to provider

## 2019-04-10 LAB — INR PPP: 2.4

## 2019-04-17 ENCOUNTER — ANTI-COAG (OUTPATIENT)
Dept: CARDIOLOGY | Age: 64
End: 2019-04-17

## 2019-04-17 DIAGNOSIS — I63.9 CEREBRAL INFARCTION, UNSPECIFIED MECHANISM (CMD): ICD-10-CM

## 2019-04-17 LAB — INR PPP: 2

## 2019-04-17 RX ORDER — TIZANIDINE 4 MG/1
TABLET ORAL
COMMUNITY

## 2019-04-17 RX ORDER — DIPHENOXYLATE HYDROCHLORIDE AND ATROPINE SULFATE 2.5; .025 MG/1; MG/1
TABLET ORAL
COMMUNITY
Start: 2009-06-12

## 2019-04-17 RX ORDER — QUETIAPINE FUMARATE 25 MG/1
TABLET, FILM COATED ORAL
COMMUNITY
Start: 2010-09-08

## 2019-04-17 RX ORDER — FAMOTIDINE 20 MG/1
TABLET, FILM COATED ORAL
COMMUNITY
Start: 2009-06-12

## 2019-04-17 RX ORDER — WARFARIN SODIUM 4 MG/1
TABLET ORAL
COMMUNITY
Start: 2018-10-30 | End: 2019-04-23 | Stop reason: SDUPTHER

## 2019-04-17 RX ORDER — LISINOPRIL 20 MG/1
TABLET ORAL
COMMUNITY
End: 2019-10-16 | Stop reason: SDUPTHER

## 2019-04-17 RX ORDER — ASCORBIC ACID 500 MG
TABLET ORAL
COMMUNITY

## 2019-04-17 RX ORDER — SIMVASTATIN 20 MG
TABLET ORAL
COMMUNITY
Start: 2012-08-06 | End: 2019-10-16 | Stop reason: SDUPTHER

## 2019-04-17 RX ORDER — ASPIRIN 325 MG
TABLET ORAL
COMMUNITY

## 2019-04-17 RX ORDER — METOPROLOL SUCCINATE 25 MG/1
TABLET, EXTENDED RELEASE ORAL
COMMUNITY
Start: 2018-10-23 | End: 2019-10-16 | Stop reason: SDUPTHER

## 2019-04-23 RX ORDER — WARFARIN SODIUM 4 MG/1
TABLET ORAL
Qty: 100 TABLET | Refills: 1 | Status: SHIPPED | OUTPATIENT
Start: 2019-04-23 | End: 2019-10-18 | Stop reason: SDUPTHER

## 2019-04-24 ENCOUNTER — ANTI-COAG (OUTPATIENT)
Dept: CARDIOLOGY | Age: 64
End: 2019-04-24

## 2019-04-24 DIAGNOSIS — I63.9 CEREBRAL INFARCTION, UNSPECIFIED MECHANISM (CMD): ICD-10-CM

## 2019-04-24 LAB — INR PPP: 2.3

## 2019-05-01 ENCOUNTER — ANTI-COAG (OUTPATIENT)
Dept: CARDIOLOGY | Age: 64
End: 2019-05-01

## 2019-05-01 DIAGNOSIS — I63.9 CEREBRAL INFARCTION, UNSPECIFIED MECHANISM (CMD): ICD-10-CM

## 2019-05-01 LAB — INR PPP: 2.3

## 2019-05-08 ENCOUNTER — ANTI-COAG (OUTPATIENT)
Dept: CARDIOLOGY | Age: 64
End: 2019-05-08

## 2019-05-08 DIAGNOSIS — I63.9 CEREBRAL INFARCTION, UNSPECIFIED MECHANISM (CMD): ICD-10-CM

## 2019-05-08 LAB — INR PPP: 2.6

## 2019-05-20 RX ORDER — SIMVASTATIN 20 MG
TABLET ORAL
Qty: 90 TABLET | Refills: 0 | Status: SHIPPED | OUTPATIENT
Start: 2019-05-20 | End: 2019-08-17

## 2019-05-21 ENCOUNTER — TELEPHONE (OUTPATIENT)
Dept: INTERNAL MEDICINE CLINIC | Facility: CLINIC | Age: 64
End: 2019-05-21

## 2019-05-22 ENCOUNTER — ANTI-COAG (OUTPATIENT)
Dept: CARDIOLOGY | Age: 64
End: 2019-05-22

## 2019-05-22 DIAGNOSIS — I63.9 CEREBRAL INFARCTION, UNSPECIFIED MECHANISM (CMD): ICD-10-CM

## 2019-05-22 LAB — INR PPP: 2.4

## 2019-05-29 ENCOUNTER — ANTI-COAG (OUTPATIENT)
Dept: CARDIOLOGY | Age: 64
End: 2019-05-29

## 2019-05-29 DIAGNOSIS — I63.9 CEREBRAL INFARCTION, UNSPECIFIED MECHANISM (CMD): ICD-10-CM

## 2019-05-29 LAB — INR PPP: 2.2

## 2019-06-05 ENCOUNTER — ANTI-COAG (OUTPATIENT)
Dept: CARDIOLOGY | Age: 64
End: 2019-06-05

## 2019-06-05 DIAGNOSIS — I63.9 CEREBRAL INFARCTION, UNSPECIFIED MECHANISM (CMD): ICD-10-CM

## 2019-06-05 LAB — INR PPP: 2.5

## 2019-06-12 LAB — INR PPP: 2.3

## 2019-06-19 LAB — INR PPP: 2.7

## 2019-06-26 ENCOUNTER — ANTI-COAG (OUTPATIENT)
Dept: CARDIOLOGY | Age: 64
End: 2019-06-26

## 2019-06-26 DIAGNOSIS — I63.9 CEREBRAL INFARCTION, UNSPECIFIED MECHANISM (CMD): ICD-10-CM

## 2019-06-26 LAB — INR PPP: 2.5

## 2019-07-03 ENCOUNTER — ANTI-COAG (OUTPATIENT)
Dept: CARDIOLOGY | Age: 64
End: 2019-07-03

## 2019-07-03 DIAGNOSIS — I63.9 CEREBRAL INFARCTION, UNSPECIFIED MECHANISM (CMD): ICD-10-CM

## 2019-07-03 LAB — INR PPP: 2.4

## 2019-07-10 ENCOUNTER — TELEPHONE (OUTPATIENT)
Dept: INTERNAL MEDICINE CLINIC | Facility: CLINIC | Age: 64
End: 2019-07-10

## 2019-07-10 DIAGNOSIS — R97.20 ELEVATED PSA: ICD-10-CM

## 2019-07-10 DIAGNOSIS — E78.00 PURE HYPERCHOLESTEROLEMIA: ICD-10-CM

## 2019-07-10 DIAGNOSIS — Z00.00 ROUTINE GENERAL MEDICAL EXAMINATION AT A HEALTH CARE FACILITY: ICD-10-CM

## 2019-07-10 DIAGNOSIS — D64.9 ANEMIA, UNSPECIFIED TYPE: Primary | ICD-10-CM

## 2019-07-10 DIAGNOSIS — Z12.5 SCREENING FOR PROSTATE CANCER: ICD-10-CM

## 2019-07-10 DIAGNOSIS — I10 ESSENTIAL HYPERTENSION: ICD-10-CM

## 2019-07-10 LAB — INR PPP: 2.3

## 2019-07-10 NOTE — TELEPHONE ENCOUNTER
Please place fasting lab orders to edward for upcoming appointment  Future Appointments   Date Time Provider Yohana Song   10/16/2019 10:00 AM Sherlie Severance, PAT[facility]ENCE [facility]     thanks

## 2019-07-17 ENCOUNTER — ANTI-COAG (OUTPATIENT)
Dept: CARDIOLOGY | Age: 64
End: 2019-07-17

## 2019-07-17 DIAGNOSIS — I63.9 CEREBRAL INFARCTION, UNSPECIFIED MECHANISM (CMD): ICD-10-CM

## 2019-07-17 LAB — INR PPP: 2.6

## 2019-07-25 LAB — INR PPP: 2.6

## 2019-08-01 ENCOUNTER — TELEPHONE (OUTPATIENT)
Dept: INTERNAL MEDICINE CLINIC | Facility: CLINIC | Age: 64
End: 2019-08-01

## 2019-08-01 LAB — INR PPP: 2.4

## 2019-08-01 NOTE — TELEPHONE ENCOUNTER
Forms received via fax from [facility] to be filled out. Form placed in SD filed folder up front and back.    Appt is scheduled for   Future Appointments   Date Time Provider Yohana Song   10/16/2019 10:00 AM PATIENCE Fuentes [facility]

## 2019-08-01 NOTE — TELEPHONE ENCOUNTER
Pedro Rodriguez 29 minutes ago (4:27 PM)       Forms received via fax from [facility] to be filled out. Form placed in SD filed folder up front and back.    Appt is scheduled for          Future Appointments   Date Time Provider Yohana Song   10/16

## 2019-08-02 ENCOUNTER — ANTI-COAG (OUTPATIENT)
Dept: CARDIOLOGY | Age: 64
End: 2019-08-02

## 2019-08-02 DIAGNOSIS — I63.9 CEREBRAL INFARCTION, UNSPECIFIED MECHANISM (CMD): ICD-10-CM

## 2019-08-07 ENCOUNTER — ANTI-COAG (OUTPATIENT)
Dept: CARDIOLOGY | Age: 64
End: 2019-08-07

## 2019-08-07 DIAGNOSIS — I63.9 CEREBRAL INFARCTION, UNSPECIFIED MECHANISM (CMD): ICD-10-CM

## 2019-08-07 LAB — INR PPP: 3

## 2019-08-09 RX ORDER — FAMOTIDINE 20 MG/1
TABLET ORAL
Qty: 180 TABLET | Refills: 1 | Status: SHIPPED | OUTPATIENT
Start: 2019-08-09 | End: 2020-02-08

## 2019-08-09 NOTE — TELEPHONE ENCOUNTER
Last Ov: 8/9/18, SD, physical  Upcoming appt: 10/16/19  Last labs: CBC, CMP, Lipid, TSH w Ref T4, PSA 9/26/18  Last Rx: famotidine 20mg, #180, 1R 2/11/19    Per Protocol, failed. No appt for last 6 mon or next 1mon. Rx pending, please sign if appropriate.

## 2019-08-14 LAB — INR PPP: 2.6

## 2019-08-17 RX ORDER — SIMVASTATIN 20 MG
TABLET ORAL
Qty: 90 TABLET | Refills: 0 | Status: SHIPPED | OUTPATIENT
Start: 2019-08-17 | End: 2021-10-25 | Stop reason: CLARIF

## 2019-08-19 ENCOUNTER — TELEPHONE (OUTPATIENT)
Dept: INTERNAL MEDICINE CLINIC | Facility: CLINIC | Age: 64
End: 2019-08-19

## 2019-08-19 ENCOUNTER — ANTI-COAG (OUTPATIENT)
Dept: CARDIOLOGY | Age: 64
End: 2019-08-19

## 2019-08-19 DIAGNOSIS — I63.9 CEREBRAL INFARCTION, UNSPECIFIED MECHANISM (CMD): ICD-10-CM

## 2019-08-19 DIAGNOSIS — F41.8 ANXIETY WITH DEPRESSION: ICD-10-CM

## 2019-08-19 DIAGNOSIS — R47.01 APHASIA: ICD-10-CM

## 2019-08-19 DIAGNOSIS — I69.30 HISTORY OF CVA WITH RESIDUAL DEFICIT: Primary | ICD-10-CM

## 2019-08-19 NOTE — TELEPHONE ENCOUNTER
Pts sister called and would like a call back with recommendations on possibly getting the pt into a psychiatrist to possible adjust meds since the pt has become a little more combative when being cared for     Please advise

## 2019-08-22 ENCOUNTER — ANTI-COAG (OUTPATIENT)
Dept: CARDIOLOGY | Age: 64
End: 2019-08-22

## 2019-08-22 DIAGNOSIS — I63.9 CEREBRAL INFARCTION, UNSPECIFIED MECHANISM (CMD): ICD-10-CM

## 2019-08-22 LAB — INR PPP: 2.4

## 2019-08-23 NOTE — TELEPHONE ENCOUNTER
I think neuro will be a better evaluation for him given his past history. If needed, can then refer to psych. First available PEÑA but order placed to Dr Gertrude Haley.

## 2019-08-23 NOTE — TELEPHONE ENCOUNTER
Spoke with pt's sister, [facility] per HIPAA), stating last couple years pt has become more combative and aggressive. Not constant. Pt currently seeing Neuro in [facility] that sister does not like, looking to switch to [facility]- provided contact info.  I

## 2019-08-29 ENCOUNTER — ANTI-COAG (OUTPATIENT)
Dept: CARDIOLOGY | Age: 64
End: 2019-08-29

## 2019-08-29 DIAGNOSIS — I63.9 CEREBRAL INFARCTION, UNSPECIFIED MECHANISM (CMD): ICD-10-CM

## 2019-08-29 LAB — INR PPP: 2.7

## 2019-09-06 ENCOUNTER — ANTI-COAG (OUTPATIENT)
Dept: CARDIOLOGY | Age: 64
End: 2019-09-06

## 2019-09-06 DIAGNOSIS — I63.9 CEREBRAL INFARCTION, UNSPECIFIED MECHANISM (CMD): ICD-10-CM

## 2019-09-06 LAB — INR PPP: 2.8

## 2019-09-10 ENCOUNTER — TELEPHONE (OUTPATIENT)
Dept: NEUROLOGY | Facility: CLINIC | Age: 64
End: 2019-09-10

## 2019-09-12 ENCOUNTER — OFFICE VISIT (OUTPATIENT)
Dept: NEUROLOGY | Facility: CLINIC | Age: 64
End: 2019-09-12
Payer: MEDICARE

## 2019-09-12 VITALS — DIASTOLIC BLOOD PRESSURE: 84 MMHG | SYSTOLIC BLOOD PRESSURE: 136 MMHG | RESPIRATION RATE: 16 BRPM | HEART RATE: 74 BPM

## 2019-09-12 DIAGNOSIS — F41.9 ANXIETY: ICD-10-CM

## 2019-09-12 DIAGNOSIS — I69.30 HISTORY OF CVA WITH RESIDUAL DEFICIT: Primary | ICD-10-CM

## 2019-09-12 DIAGNOSIS — F69 BEHAVIOR PROBLEM, ADULT: ICD-10-CM

## 2019-09-12 DIAGNOSIS — R45.1 AGITATION: ICD-10-CM

## 2019-09-12 DIAGNOSIS — R47.01 APHASIA: ICD-10-CM

## 2019-09-12 DIAGNOSIS — Z79.01 CHRONIC ANTICOAGULATION: ICD-10-CM

## 2019-09-12 PROCEDURE — 99205 OFFICE O/P NEW HI 60 MIN: CPT | Performed by: OTHER

## 2019-09-12 RX ORDER — LORAZEPAM 1 MG/1
1 TABLET ORAL EVERY 4 HOURS PRN
Qty: 2 TABLET | Refills: 0 | Status: SHIPPED | OUTPATIENT
Start: 2019-09-12 | End: 2020-10-21

## 2019-09-12 RX ORDER — QUETIAPINE 25 MG/1
50 TABLET, FILM COATED ORAL 2 TIMES DAILY
Qty: 120 TABLET | Refills: 5 | Status: SHIPPED | OUTPATIENT
Start: 2019-09-12 | End: 2020-04-16

## 2019-09-12 NOTE — PROGRESS NOTES
[facility] [address]   Neurology; INITIAL CLINIC VISIT  2019, 9:47 AM     Marek Arroyo.  Patient Status:  No patient class for patient encounter    1955 MRN [id number]   Location [address] [place] [address] Edema     B/L LE   • Essential hypertension    • Factor 5 Leiden mutation, heterozygous ([facility]    • HTN (hypertension)    • Hypercholesteremia    • Hyperlipidemia    • PFO (patent foramen ovale)    • Stroke ([facility] 4/9/2009   • Unspecified transient cerebral Take 8 mg by mouth 2 (two) times daily.    Disp:  Rfl:    MULTI-VITAMIN OR 1 DAILY Disp:  Rfl:          REVIEW OF SYSTEMS:    GENERAL HEALTH:  Agitated,  normal appetite,   EYES: no visual complaints or deficits   not able to do due to aphasia,     PHYSICAL CT, sister said it is impossible to do any test without sedation, but recently he has more wheezing, it is not safe to give IV sedation.    See primary to rule out URI    EEG to be done, call sister with result,     Symptomatic treatment included increase

## 2019-09-13 ENCOUNTER — ANTI-COAG (OUTPATIENT)
Dept: CARDIOLOGY | Age: 64
End: 2019-09-13

## 2019-09-13 DIAGNOSIS — I63.9 CEREBRAL INFARCTION, UNSPECIFIED MECHANISM (CMD): ICD-10-CM

## 2019-09-13 LAB — INR PPP: 2.8

## 2019-09-18 ENCOUNTER — ANTI-COAG (OUTPATIENT)
Dept: CARDIOLOGY | Age: 64
End: 2019-09-18

## 2019-09-18 DIAGNOSIS — I63.9 CEREBRAL INFARCTION, UNSPECIFIED MECHANISM (CMD): ICD-10-CM

## 2019-09-18 LAB — INR PPP: 2.9

## 2019-09-25 ENCOUNTER — ANTI-COAG (OUTPATIENT)
Dept: CARDIOLOGY | Age: 64
End: 2019-09-25

## 2019-09-25 DIAGNOSIS — I63.9 CEREBRAL INFARCTION, UNSPECIFIED MECHANISM (CMD): ICD-10-CM

## 2019-09-25 LAB — INR PPP: 2.4

## 2019-10-02 RX ORDER — LISINOPRIL 20 MG/1
20 TABLET ORAL
Qty: 90 TABLET | Refills: 0 | Status: SHIPPED | OUTPATIENT
Start: 2019-10-02 | End: 2021-10-25

## 2019-10-02 NOTE — TELEPHONE ENCOUNTER
Protocol failed due to CMP or BMP in past 12 months    Please advise,    LOV:8/9/18 SD  FOV:10/16/19   LAST RX:4/8/19 20 mg take 1 tab daily 90 tabs 1 refill   LAST LABS:9/26/18 pt,psa,tsh,lipids,cmp,cbc  PER PROTOCOL: to provider

## 2019-10-08 ENCOUNTER — ANTI-COAG (OUTPATIENT)
Dept: CARDIOLOGY | Age: 64
End: 2019-10-08

## 2019-10-08 DIAGNOSIS — I63.9 CEREBRAL INFARCTION, UNSPECIFIED MECHANISM (CMD): ICD-10-CM

## 2019-10-08 LAB — INR PPP: 2.5

## 2019-10-09 ENCOUNTER — LAB ENCOUNTER (OUTPATIENT)
Dept: LAB | Age: 64
End: 2019-10-09
Attending: NURSE PRACTITIONER
Payer: MEDICARE

## 2019-10-09 DIAGNOSIS — Z12.5 SCREENING FOR PROSTATE CANCER: ICD-10-CM

## 2019-10-09 DIAGNOSIS — E78.00 PURE HYPERCHOLESTEROLEMIA: ICD-10-CM

## 2019-10-09 DIAGNOSIS — I10 ESSENTIAL HYPERTENSION: ICD-10-CM

## 2019-10-09 DIAGNOSIS — Z00.00 ROUTINE GENERAL MEDICAL EXAMINATION AT A HEALTH CARE FACILITY: ICD-10-CM

## 2019-10-09 DIAGNOSIS — D64.9 ANEMIA, UNSPECIFIED TYPE: ICD-10-CM

## 2019-10-09 PROCEDURE — 36415 COLL VENOUS BLD VENIPUNCTURE: CPT

## 2019-10-09 PROCEDURE — 85025 COMPLETE CBC W/AUTO DIFF WBC: CPT

## 2019-10-09 PROCEDURE — 80053 COMPREHEN METABOLIC PANEL: CPT

## 2019-10-09 PROCEDURE — 84443 ASSAY THYROID STIM HORMONE: CPT

## 2019-10-09 PROCEDURE — 80061 LIPID PANEL: CPT

## 2019-10-16 ENCOUNTER — OFFICE VISIT (OUTPATIENT)
Dept: INTERNAL MEDICINE CLINIC | Facility: CLINIC | Age: 64
End: 2019-10-16
Payer: MEDICARE

## 2019-10-16 ENCOUNTER — OFFICE VISIT (OUTPATIENT)
Dept: CARDIOLOGY | Age: 64
End: 2019-10-16

## 2019-10-16 ENCOUNTER — ANTI-COAG (OUTPATIENT)
Dept: CARDIOLOGY | Age: 64
End: 2019-10-16

## 2019-10-16 VITALS
WEIGHT: 233 LBS | SYSTOLIC BLOOD PRESSURE: 140 MMHG | HEART RATE: 84 BPM | DIASTOLIC BLOOD PRESSURE: 64 MMHG | HEIGHT: 66 IN | BODY MASS INDEX: 37.45 KG/M2

## 2019-10-16 VITALS
WEIGHT: 230 LBS | BODY MASS INDEX: 36 KG/M2 | SYSTOLIC BLOOD PRESSURE: 132 MMHG | DIASTOLIC BLOOD PRESSURE: 76 MMHG | HEART RATE: 92 BPM

## 2019-10-16 DIAGNOSIS — Z87.39 HISTORY OF GOUT: ICD-10-CM

## 2019-10-16 DIAGNOSIS — F41.9 ANXIETY: ICD-10-CM

## 2019-10-16 DIAGNOSIS — I73.9 PERIPHERAL VASCULAR DISEASE, UNSPECIFIED (HCC): ICD-10-CM

## 2019-10-16 DIAGNOSIS — E78.00 PURE HYPERCHOLESTEROLEMIA: ICD-10-CM

## 2019-10-16 DIAGNOSIS — I65.23 BILATERAL CAROTID ARTERY STENOSIS: ICD-10-CM

## 2019-10-16 DIAGNOSIS — Z00.00 ENCOUNTER FOR ANNUAL HEALTH EXAMINATION: Primary | ICD-10-CM

## 2019-10-16 DIAGNOSIS — R05.9 COUGH: ICD-10-CM

## 2019-10-16 DIAGNOSIS — Z86.79 HISTORY OF VENTRICULAR TACHYCARDIA: ICD-10-CM

## 2019-10-16 DIAGNOSIS — Z91.81 AT RISK FOR FALLING: ICD-10-CM

## 2019-10-16 DIAGNOSIS — Q21.1 OSTIUM SECUNDUM TYPE ATRIAL SEPTAL DEFECT: ICD-10-CM

## 2019-10-16 DIAGNOSIS — R47.01 APHASIA: ICD-10-CM

## 2019-10-16 DIAGNOSIS — N18.30 CKD (CHRONIC KIDNEY DISEASE) STAGE 3, GFR 30-59 ML/MIN (HCC): ICD-10-CM

## 2019-10-16 DIAGNOSIS — I69.30 HISTORY OF CVA WITH RESIDUAL DEFICIT: ICD-10-CM

## 2019-10-16 DIAGNOSIS — D64.9 ANEMIA, UNSPECIFIED TYPE: ICD-10-CM

## 2019-10-16 DIAGNOSIS — N18.30 CHRONIC RENAL IMPAIRMENT, STAGE 3 (MODERATE) (CMD): ICD-10-CM

## 2019-10-16 DIAGNOSIS — Z79.01 CHRONIC ANTICOAGULATION: ICD-10-CM

## 2019-10-16 DIAGNOSIS — I63.9 CEREBRAL INFARCTION, UNSPECIFIED MECHANISM (CMD): ICD-10-CM

## 2019-10-16 DIAGNOSIS — I10 ESSENTIAL HYPERTENSION: ICD-10-CM

## 2019-10-16 DIAGNOSIS — Q21.12 PATENT FORAMEN OVALE: ICD-10-CM

## 2019-10-16 DIAGNOSIS — R45.1 AGITATION: ICD-10-CM

## 2019-10-16 DIAGNOSIS — R97.20 ELEVATED PSA: ICD-10-CM

## 2019-10-16 DIAGNOSIS — Z79.01 ANTICOAGULATED ON COUMADIN: ICD-10-CM

## 2019-10-16 DIAGNOSIS — Z86.73 H/O CEREBRAL INFARCTION: ICD-10-CM

## 2019-10-16 DIAGNOSIS — E78.00 PURE HYPERCHOLESTEROLEMIA: Primary | ICD-10-CM

## 2019-10-16 DIAGNOSIS — Z87.01 HISTORY OF PNEUMONIA: ICD-10-CM

## 2019-10-16 DIAGNOSIS — M19.071 ARTHRITIS OF ANKLE, RIGHT: ICD-10-CM

## 2019-10-16 LAB — INR PPP: 3

## 2019-10-16 PROCEDURE — 99214 OFFICE O/P EST MOD 30 MIN: CPT | Performed by: INTERNAL MEDICINE

## 2019-10-16 PROCEDURE — 99214 OFFICE O/P EST MOD 30 MIN: CPT | Performed by: NURSE PRACTITIONER

## 2019-10-16 PROCEDURE — G0439 PPPS, SUBSEQ VISIT: HCPCS | Performed by: NURSE PRACTITIONER

## 2019-10-16 RX ORDER — SIMVASTATIN 20 MG
20 TABLET ORAL AT BEDTIME
Qty: 90 TABLET | Refills: 4 | Status: SHIPPED | OUTPATIENT
Start: 2019-10-16 | End: 2020-11-02

## 2019-10-16 RX ORDER — METOPROLOL SUCCINATE 25 MG/1
25 TABLET, EXTENDED RELEASE ORAL AT BEDTIME
Qty: 90 TABLET | Refills: 4 | Status: SHIPPED | OUTPATIENT
Start: 2019-10-16 | End: 2021-01-08

## 2019-10-16 RX ORDER — LISINOPRIL 20 MG/1
20 TABLET ORAL DAILY
Qty: 90 TABLET | Refills: 4 | Status: SHIPPED | OUTPATIENT
Start: 2019-10-16 | End: 2020-12-17

## 2019-10-16 RX ORDER — AMOXICILLIN AND CLAVULANATE POTASSIUM 875; 125 MG/1; MG/1
1 TABLET, FILM COATED ORAL 2 TIMES DAILY
Qty: 20 TABLET | Refills: 0 | Status: SHIPPED | OUTPATIENT
Start: 2019-10-16 | End: 2019-10-26

## 2019-10-16 ASSESSMENT — PATIENT HEALTH QUESTIONNAIRE - PHQ9
2. FEELING DOWN, DEPRESSED OR HOPELESS: NOT AT ALL
SUM OF ALL RESPONSES TO PHQ9 QUESTIONS 1 AND 2: 0
SUM OF ALL RESPONSES TO PHQ9 QUESTIONS 1 AND 2: 0
1. LITTLE INTEREST OR PLEASURE IN DOING THINGS: NOT AT ALL

## 2019-10-16 NOTE — PATIENT INSTRUCTIONS
Toño Sarmiento Jr.'s SCREENING SCHEDULE   Tests on this list are recommended by your physician but may not be covered, or covered at this frequency, by your insurer. Please check with your insurance carrier before scheduling to verify coverage.     PREVEN are 73-68 years old and have smoked more than 100 cigarettes in their lifetime   • Anyone with a family history    Colorectal Cancer Screening Covered up to Age 76     Colonoscopy Screen   Covered every 10 years- more often if abnormal Colonoscopy due on 0 received Factor VIII or IX concentrates   Clients of institutions for the mentally retarded   Persons who live in the same house as a HepB virus carrier   Homosexual men   Illicit injectable drug abusers     Tetanus Toxoid- Only covered with a cut with met

## 2019-10-16 NOTE — PROGRESS NOTES
HPI:   Mansoor Triplett. is a 59year old male who presents for a Medicare Subsequent Annual Wellness visit (Pt already had Initial Annual Wellness).     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 Functional Ability/Status   Dinuba Jennifer. has some abnormal functions as listed below:  He has Dressing and/or Bathing issues based on screening of functional status.    Difficulty dressing Epic.   Not Discussed         He smoked tobacco in the past but quit greater than 12 months ago.   Social History    Tobacco Use      Smoking status: Former Smoker        Quit date: 11/12/2008        Years since quitting: 10.9      Smokeless tobacco: Never ALLERGIES:   He has No Known Allergies. CURRENT MEDICATIONS:   Amoxicillin-Pot Clavulanate 875-125 MG Oral Tab, Take 1 tablet by mouth 2 (two) times daily for 10 days.        MEDICAL INFORMATION:   He  has a past medical history of Abnormal weight ga the following:    Height as of 10/12/15: 67\". Weight as of this encounter: 230 lb (104.3 kg).     Medicare Hearing Assessment  (Required for AWV/SWV)    Unable to assess        Visual Acuity                           General Appearance:  Alert, appears today.     History of CVA with residual deficit  Stable      Aphasia  Stable      Arthritis of ankle, right  Stable  Cont to monitor.      CKD (chronic kidney disease) stage 3, GFR 30-59 ml/min ([facility])  Creat 1.56  Baseline    Anemia, unspecified type  hgb st or Procedure External Lab or Procedure   Diabetes Screening      HbgA1C   Annually No results found for: A1C    No flowsheet data found.     Fasting Blood Sugar (FSB)Annually Glucose (mg/dL)   Date Value   10/09/2019 97     GLUCOSE (mg/dL)   Date Value   06 abusers     Tetanus Toxoid  Only covered with a cut with metal- TD and TDaP Not covered by Medicare Part B) No vaccine history found This may be covered with your prescription benefits, but Medicare does not cover unless Medically needed    Zoster   Not co

## 2019-10-17 PROBLEM — R97.20 ELEVATED PSA: Status: ACTIVE | Noted: 2019-10-17

## 2019-10-18 RX ORDER — WARFARIN SODIUM 4 MG/1
TABLET ORAL
Qty: 135 TABLET | Refills: 1 | Status: SHIPPED | OUTPATIENT
Start: 2019-10-18 | End: 2020-04-03

## 2019-10-25 ENCOUNTER — ANTI-COAG (OUTPATIENT)
Dept: CARDIOLOGY | Age: 64
End: 2019-10-25

## 2019-10-25 DIAGNOSIS — I63.9 CEREBRAL INFARCTION, UNSPECIFIED MECHANISM (CMD): ICD-10-CM

## 2019-10-25 LAB — INR PPP: 2.7

## 2019-10-31 ENCOUNTER — ANTI-COAG (OUTPATIENT)
Dept: CARDIOLOGY | Age: 64
End: 2019-10-31

## 2019-10-31 DIAGNOSIS — I63.9 CEREBRAL INFARCTION, UNSPECIFIED MECHANISM (CMD): ICD-10-CM

## 2019-10-31 LAB — INR PPP: 4.9

## 2019-11-06 LAB — INR PPP: 2.3

## 2019-11-07 ENCOUNTER — ANTI-COAG (OUTPATIENT)
Dept: CARDIOLOGY | Age: 64
End: 2019-11-07

## 2019-11-07 DIAGNOSIS — I63.9 CEREBRAL INFARCTION, UNSPECIFIED MECHANISM (CMD): ICD-10-CM

## 2019-11-13 ENCOUNTER — ANTI-COAG (OUTPATIENT)
Dept: CARDIOLOGY | Age: 64
End: 2019-11-13

## 2019-11-13 DIAGNOSIS — I63.9 CEREBRAL INFARCTION, UNSPECIFIED MECHANISM (CMD): ICD-10-CM

## 2019-11-13 LAB — INR PPP: 2.9

## 2019-11-21 ENCOUNTER — ANTI-COAG (OUTPATIENT)
Dept: CARDIOLOGY | Age: 64
End: 2019-11-21

## 2019-11-21 DIAGNOSIS — I63.9 CEREBRAL INFARCTION, UNSPECIFIED MECHANISM (CMD): ICD-10-CM

## 2019-11-21 LAB — INR PPP: 3

## 2019-12-04 ENCOUNTER — ANTI-COAG (OUTPATIENT)
Dept: CARDIOLOGY | Age: 64
End: 2019-12-04

## 2019-12-04 DIAGNOSIS — I63.9 CEREBRAL INFARCTION, UNSPECIFIED MECHANISM (CMD): ICD-10-CM

## 2019-12-04 LAB — INR PPP: 2.9

## 2019-12-11 LAB — INR PPP: 2.3

## 2019-12-12 ENCOUNTER — ANTI-COAG (OUTPATIENT)
Dept: CARDIOLOGY | Age: 64
End: 2019-12-12

## 2019-12-12 DIAGNOSIS — I63.9 CEREBRAL INFARCTION, UNSPECIFIED MECHANISM (CMD): ICD-10-CM

## 2019-12-19 LAB — INR PPP: 2.1

## 2019-12-20 ENCOUNTER — ANTI-COAG (OUTPATIENT)
Dept: CARDIOLOGY | Age: 64
End: 2019-12-20

## 2019-12-20 DIAGNOSIS — I63.9 CEREBRAL INFARCTION, UNSPECIFIED MECHANISM (CMD): ICD-10-CM

## 2019-12-26 ENCOUNTER — ANTI-COAG (OUTPATIENT)
Dept: CARDIOLOGY | Age: 64
End: 2019-12-26

## 2019-12-26 DIAGNOSIS — I63.9 CEREBRAL INFARCTION, UNSPECIFIED MECHANISM (CMD): ICD-10-CM

## 2019-12-26 LAB — INR PPP: 2.6

## 2020-01-02 ENCOUNTER — ANTI-COAG (OUTPATIENT)
Dept: CARDIOLOGY | Age: 65
End: 2020-01-02

## 2020-01-02 DIAGNOSIS — I63.9 CEREBRAL INFARCTION, UNSPECIFIED MECHANISM (CMD): ICD-10-CM

## 2020-01-02 LAB — INR PPP: 3

## 2020-01-09 ENCOUNTER — ANTI-COAG (OUTPATIENT)
Dept: CARDIOLOGY | Age: 65
End: 2020-01-09

## 2020-01-09 DIAGNOSIS — I63.9 CEREBRAL INFARCTION, UNSPECIFIED MECHANISM (CMD): ICD-10-CM

## 2020-01-09 LAB — INR PPP: 2.7

## 2020-01-16 ENCOUNTER — ANTI-COAG (OUTPATIENT)
Dept: CARDIOLOGY | Age: 65
End: 2020-01-16

## 2020-01-16 DIAGNOSIS — I63.9 CEREBRAL INFARCTION, UNSPECIFIED MECHANISM (CMD): ICD-10-CM

## 2020-01-16 LAB — INR PPP: 2.4

## 2020-01-23 ENCOUNTER — ANTI-COAG (OUTPATIENT)
Dept: CARDIOLOGY | Age: 65
End: 2020-01-23

## 2020-01-23 DIAGNOSIS — I63.9 CEREBRAL INFARCTION, UNSPECIFIED MECHANISM (CMD): ICD-10-CM

## 2020-01-23 LAB — INR PPP: 2.7

## 2020-02-02 LAB — INR PPP: 2.4 (ref 2–3)

## 2020-02-03 ENCOUNTER — ANTI-COAG (OUTPATIENT)
Dept: CARDIOLOGY | Age: 65
End: 2020-02-03

## 2020-02-03 DIAGNOSIS — I63.9 CEREBRAL INFARCTION, UNSPECIFIED MECHANISM (CMD): ICD-10-CM

## 2020-02-06 ENCOUNTER — ANTI-COAG (OUTPATIENT)
Dept: CARDIOLOGY | Age: 65
End: 2020-02-06

## 2020-02-06 DIAGNOSIS — I63.9 CEREBRAL INFARCTION, UNSPECIFIED MECHANISM (CMD): ICD-10-CM

## 2020-02-06 LAB — INR PPP: 2.5 (ref 2–3)

## 2020-02-08 RX ORDER — FAMOTIDINE 20 MG/1
TABLET ORAL
Qty: 180 TABLET | Refills: 1 | Status: SHIPPED | OUTPATIENT
Start: 2020-02-08 | End: 2020-08-11

## 2020-02-13 ENCOUNTER — ANTI-COAG (OUTPATIENT)
Dept: CARDIOLOGY | Age: 65
End: 2020-02-13

## 2020-02-13 DIAGNOSIS — I63.9 CEREBRAL INFARCTION, UNSPECIFIED MECHANISM (CMD): ICD-10-CM

## 2020-02-13 LAB — INR PPP: 2.8 (ref 2–3)

## 2020-02-20 ENCOUNTER — ANTI-COAG (OUTPATIENT)
Dept: CARDIOLOGY | Age: 65
End: 2020-02-20

## 2020-02-20 DIAGNOSIS — I63.9 CEREBRAL INFARCTION, UNSPECIFIED MECHANISM (CMD): ICD-10-CM

## 2020-02-20 LAB — INR PPP: 2.2 (ref 2–3)

## 2020-02-27 ENCOUNTER — ANTI-COAG (OUTPATIENT)
Dept: CARDIOLOGY | Age: 65
End: 2020-02-27

## 2020-02-27 DIAGNOSIS — I63.9 CEREBRAL INFARCTION, UNSPECIFIED MECHANISM (CMD): ICD-10-CM

## 2020-02-27 LAB — INR PPP: 2.2 (ref 2–3)

## 2020-03-04 LAB — INR PPP: 2 (ref 2–3)

## 2020-03-05 ENCOUNTER — ANTI-COAG (OUTPATIENT)
Dept: CARDIOLOGY | Age: 65
End: 2020-03-05

## 2020-03-05 DIAGNOSIS — I63.9 CEREBRAL INFARCTION, UNSPECIFIED MECHANISM (CMD): ICD-10-CM

## 2020-03-06 ENCOUNTER — TELEPHONE (OUTPATIENT)
Dept: INTERNAL MEDICINE CLINIC | Facility: CLINIC | Age: 65
End: 2020-03-06

## 2020-03-06 NOTE — TELEPHONE ENCOUNTER
fax received back from [employer] for supplies form is missing intials and date for new info added placed in JL's bin to be initialed and dated .  When done please fax form to [phone]

## 2020-03-12 LAB — INR PPP: 2.2

## 2020-03-13 ENCOUNTER — ANTI-COAG (OUTPATIENT)
Dept: CARDIOLOGY | Age: 65
End: 2020-03-13

## 2020-03-13 DIAGNOSIS — I63.9 CEREBRAL INFARCTION, UNSPECIFIED MECHANISM (CMD): ICD-10-CM

## 2020-03-19 LAB — INR PPP: 2.4

## 2020-03-20 ENCOUNTER — ANTI-COAG (OUTPATIENT)
Dept: CARDIOLOGY | Age: 65
End: 2020-03-20

## 2020-03-20 DIAGNOSIS — I63.9 CEREBRAL INFARCTION, UNSPECIFIED MECHANISM (CMD): ICD-10-CM

## 2020-03-26 ENCOUNTER — ANTI-COAG (OUTPATIENT)
Dept: CARDIOLOGY | Age: 65
End: 2020-03-26

## 2020-03-26 DIAGNOSIS — I63.9 CEREBRAL INFARCTION, UNSPECIFIED MECHANISM (CMD): ICD-10-CM

## 2020-03-26 LAB — INR PPP: 2.3

## 2020-04-02 LAB — INR PPP: 2.1

## 2020-04-03 ENCOUNTER — ANTI-COAG (OUTPATIENT)
Dept: CARDIOLOGY | Age: 65
End: 2020-04-03

## 2020-04-03 DIAGNOSIS — I63.9 CEREBRAL INFARCTION, UNSPECIFIED MECHANISM (CMD): ICD-10-CM

## 2020-04-03 RX ORDER — WARFARIN SODIUM 4 MG/1
4 TABLET ORAL DAILY
Qty: 135 TABLET | Refills: 1 | Status: SHIPPED | OUTPATIENT
Start: 2020-04-03 | End: 2020-04-07 | Stop reason: SDUPTHER

## 2020-04-03 RX ORDER — WARFARIN SODIUM 4 MG/1
TABLET ORAL
Qty: 100 TABLET | Refills: 1 | Status: SHIPPED | OUTPATIENT
Start: 2020-04-03 | End: 2020-04-03 | Stop reason: SDUPTHER

## 2020-04-07 RX ORDER — WARFARIN SODIUM 4 MG/1
TABLET ORAL
Qty: 110 TABLET | Refills: 1 | Status: SHIPPED | OUTPATIENT
Start: 2020-04-07 | End: 2020-06-29

## 2020-04-09 ENCOUNTER — ANTI-COAG (OUTPATIENT)
Dept: CARDIOLOGY | Age: 65
End: 2020-04-09

## 2020-04-09 DIAGNOSIS — I63.9 CEREBRAL INFARCTION, UNSPECIFIED MECHANISM (CMD): ICD-10-CM

## 2020-04-09 LAB — INR PPP: 1.8

## 2020-04-16 NOTE — TELEPHONE ENCOUNTER
Medication: QUETIAPINE FUMARATE 25 MG     Date of last refill: 9/12/19 (#120/5)  [place]    Last office visit: 9/12/19  Due back to clinic per last office note:  3 months  Date next office visit scheduled:  No future

## 2020-04-17 ENCOUNTER — ANTI-COAG (OUTPATIENT)
Dept: CARDIOLOGY | Age: 65
End: 2020-04-17

## 2020-04-17 DIAGNOSIS — I63.9 CEREBRAL INFARCTION, UNSPECIFIED MECHANISM (CMD): ICD-10-CM

## 2020-04-17 LAB — INR PPP: 2.6

## 2020-04-24 ENCOUNTER — ANTI-COAG (OUTPATIENT)
Dept: CARDIOLOGY | Age: 65
End: 2020-04-24

## 2020-04-24 DIAGNOSIS — I63.9 CEREBRAL INFARCTION, UNSPECIFIED MECHANISM (CMD): ICD-10-CM

## 2020-04-24 LAB — INR PPP: 2.6

## 2020-05-02 LAB — INR PPP: 3

## 2020-05-04 ENCOUNTER — ANTI-COAG (OUTPATIENT)
Dept: CARDIOLOGY | Age: 65
End: 2020-05-04

## 2020-05-04 DIAGNOSIS — I63.9 CEREBRAL INFARCTION, UNSPECIFIED MECHANISM (CMD): ICD-10-CM

## 2020-05-08 ENCOUNTER — ANTI-COAG (OUTPATIENT)
Dept: CARDIOLOGY | Age: 65
End: 2020-05-08

## 2020-05-08 DIAGNOSIS — I63.9 CEREBRAL INFARCTION, UNSPECIFIED MECHANISM (CMD): ICD-10-CM

## 2020-05-08 LAB — INR PPP: 2.7

## 2020-05-11 ENCOUNTER — TELEPHONE (OUTPATIENT)
Dept: INTERNAL MEDICINE CLINIC | Facility: CLINIC | Age: 65
End: 2020-05-11

## 2020-05-11 DIAGNOSIS — D64.9 ANEMIA, UNSPECIFIED TYPE: ICD-10-CM

## 2020-05-11 DIAGNOSIS — Z00.00 ROUTINE GENERAL MEDICAL EXAMINATION AT A HEALTH CARE FACILITY: Primary | ICD-10-CM

## 2020-05-11 DIAGNOSIS — Z12.5 SCREENING FOR MALIGNANT NEOPLASM OF PROSTATE: ICD-10-CM

## 2020-05-11 DIAGNOSIS — I10 ESSENTIAL HYPERTENSION: ICD-10-CM

## 2020-05-11 DIAGNOSIS — E78.00 PURE HYPERCHOLESTEROLEMIA: ICD-10-CM

## 2020-05-11 NOTE — TELEPHONE ENCOUNTER
Orders to edward. Pt aware to get labs done no sooner than 2 weeks prior to the appt. Pt aware to fast.  No call back required.   Future Appointments   Date Time Provider Yohana Song   10/21/2020  9:20 AM PATIENCE Richard [facility]

## 2020-05-15 NOTE — PROGRESS NOTES
This follow up is conducted as a video visit after consent was obtained from patient and patient' POA      HISTORY OF PRESENT ILLNESS:  Alfie Boland. is a(n) 59year old, right handed,  male who presents for spasm and tremor management.    Sister is PO his situation, sister prefer current management to get refill of tizanidine,      Symptomatic treatment included keeping Seroquel to 50 mg bid,   Continue tizanidine to 8 mg bid,   See him in a year, give him a year refills     We discussed in depth regard

## 2020-05-16 LAB — INR PPP: 2.4

## 2020-05-18 ENCOUNTER — ANTI-COAG (OUTPATIENT)
Dept: CARDIOLOGY | Age: 65
End: 2020-05-18

## 2020-05-18 DIAGNOSIS — I63.9 CEREBRAL INFARCTION, UNSPECIFIED MECHANISM (CMD): ICD-10-CM

## 2020-05-21 LAB — INR PPP: 2.1

## 2020-05-22 ENCOUNTER — ANTI-COAG (OUTPATIENT)
Dept: CARDIOLOGY | Age: 65
End: 2020-05-22

## 2020-05-22 DIAGNOSIS — I63.9 CEREBRAL INFARCTION, UNSPECIFIED MECHANISM (CMD): ICD-10-CM

## 2020-05-29 ENCOUNTER — ANTI-COAG (OUTPATIENT)
Dept: CARDIOLOGY | Age: 65
End: 2020-05-29

## 2020-05-29 DIAGNOSIS — I63.9 CEREBRAL INFARCTION, UNSPECIFIED MECHANISM (CMD): ICD-10-CM

## 2020-05-29 LAB — INR PPP: 2

## 2020-06-04 LAB — INR PPP: 2

## 2020-06-05 ENCOUNTER — ANTI-COAG (OUTPATIENT)
Dept: CARDIOLOGY | Age: 65
End: 2020-06-05

## 2020-06-12 ENCOUNTER — ANTI-COAG (OUTPATIENT)
Dept: CARDIOLOGY | Age: 65
End: 2020-06-12

## 2020-06-12 LAB — INR PPP: 2.1

## 2020-06-19 ENCOUNTER — ANTI-COAG (OUTPATIENT)
Dept: CARDIOLOGY | Age: 65
End: 2020-06-19

## 2020-06-19 LAB — INR PPP: 2.6

## 2020-06-26 LAB — INR PPP: 2.5

## 2020-06-29 ENCOUNTER — ANTI-COAG (OUTPATIENT)
Dept: CARDIOLOGY | Age: 65
End: 2020-06-29

## 2020-06-29 RX ORDER — WARFARIN SODIUM 4 MG/1
TABLET ORAL
Qty: 135 TABLET | Refills: 1 | Status: SHIPPED | OUTPATIENT
Start: 2020-06-29 | End: 2020-12-17

## 2020-07-03 LAB — INR PPP: 2.1

## 2020-07-06 ENCOUNTER — ANTI-COAG (OUTPATIENT)
Dept: CARDIOLOGY | Age: 65
End: 2020-07-06

## 2020-07-10 ENCOUNTER — ANTI-COAG (OUTPATIENT)
Dept: CARDIOLOGY | Age: 65
End: 2020-07-10

## 2020-07-10 LAB — INR PPP: 2.2

## 2020-07-19 LAB — INR PPP: 2.1

## 2020-07-20 ENCOUNTER — ANTI-COAG (OUTPATIENT)
Dept: CARDIOLOGY | Age: 65
End: 2020-07-20

## 2020-07-27 ENCOUNTER — ANTI-COAG (OUTPATIENT)
Dept: CARDIOLOGY | Age: 65
End: 2020-07-27

## 2020-07-27 LAB — INR PPP: 2.4

## 2020-08-03 ENCOUNTER — ANTI-COAG (OUTPATIENT)
Dept: CARDIOLOGY | Age: 65
End: 2020-08-03

## 2020-08-03 LAB — INR PPP: 2.8

## 2020-08-11 RX ORDER — FAMOTIDINE 20 MG/1
TABLET ORAL
Qty: 180 TABLET | Refills: 0 | Status: SHIPPED | OUTPATIENT
Start: 2020-08-11 | End: 2020-08-24

## 2020-08-11 NOTE — TELEPHONE ENCOUNTER
Last OV: 10/6/19 wellness visit    Future Appointments   Date Time Provider Yohana Song   10/21/2020  9:20 AM PATIENCE Gilbert [facility]        Latest labs: 10/9/19 CBC, MP, Lipid, PSA and TSH w/ref    Latest RX: FAMOTIDINE 20 MG TABLET

## 2020-08-12 ENCOUNTER — ANTI-COAG (OUTPATIENT)
Dept: CARDIOLOGY | Age: 65
End: 2020-08-12

## 2020-08-12 LAB — INR PPP: 2.9

## 2020-08-20 ENCOUNTER — ANTI-COAG (OUTPATIENT)
Dept: CARDIOLOGY | Age: 65
End: 2020-08-20

## 2020-08-20 LAB — INR PPP: 2.6

## 2020-08-22 RX ORDER — FAMOTIDINE 20 MG/1
TABLET ORAL
Qty: 180 TABLET | Refills: 1 | Status: CANCELLED | OUTPATIENT
Start: 2020-08-22

## 2020-08-24 RX ORDER — FAMOTIDINE 20 MG/1
TABLET ORAL
Qty: 180 TABLET | Refills: 0 | Status: SHIPPED | OUTPATIENT
Start: 2020-08-24 | End: 2021-02-25

## 2020-08-24 NOTE — TELEPHONE ENCOUNTER
Last Ov: 10/16/19, SD, CPE  Last labs: TSH w Ref, PSA, Lipid, CMP, CBC 10/9/19  Last Rx: famotidine 20mg, #180, 0R 8/11/20    Future Appointments   Date Time Provider Yohana Song   10/21/2020  9:20 AM Bryan Hernandez, APRN [facility]       Pe

## 2020-08-27 ENCOUNTER — ANTI-COAG (OUTPATIENT)
Dept: CARDIOLOGY | Age: 65
End: 2020-08-27

## 2020-08-27 LAB — INR PPP: 2.2

## 2020-08-28 ENCOUNTER — TELEPHONE (OUTPATIENT)
Dept: INTERNAL MEDICINE CLINIC | Facility: CLINIC | Age: 65
End: 2020-08-28

## 2020-08-28 NOTE — TELEPHONE ENCOUNTER
Left message for sister Magi Lepe to see if forms needed to be completed before upcoming ov in order to have labs and ov date on forms. If they are due before this, I can use last ov and upcoming visits.     Spoke with sister Sally Rivera completed   Ok to fax

## 2020-08-28 NOTE — TELEPHONE ENCOUNTER
Paperwork received from [employer] in regards to continue disability. Placed on SD desk for review and completion.

## 2020-09-08 ENCOUNTER — ANTI-COAG (OUTPATIENT)
Dept: CARDIOLOGY | Age: 65
End: 2020-09-08

## 2020-09-08 LAB — INR PPP: 2.3

## 2020-09-11 LAB — INR PPP: 2.2

## 2020-09-14 ENCOUNTER — ANTI-COAG (OUTPATIENT)
Dept: CARDIOLOGY | Age: 65
End: 2020-09-14

## 2020-09-17 ENCOUNTER — ANTI-COAG (OUTPATIENT)
Dept: CARDIOLOGY | Age: 65
End: 2020-09-17

## 2020-09-17 LAB — INR PPP: 2.2

## 2020-09-25 ENCOUNTER — ANTI-COAG (OUTPATIENT)
Dept: CARDIOLOGY | Age: 65
End: 2020-09-25

## 2020-09-25 DIAGNOSIS — I63.219 CEREBRAL INFARCTION DUE TO STENOSIS OF VERTEBRAL ARTERY, UNSPECIFIED BLOOD VESSEL LATERALITY (CMD): ICD-10-CM

## 2020-09-25 LAB — INR PPP: 2

## 2020-10-02 ENCOUNTER — ANTI-COAG (OUTPATIENT)
Dept: CARDIOLOGY | Age: 65
End: 2020-10-02

## 2020-10-02 DIAGNOSIS — I63.219 CEREBRAL INFARCTION DUE TO STENOSIS OF VERTEBRAL ARTERY, UNSPECIFIED BLOOD VESSEL LATERALITY (CMD): ICD-10-CM

## 2020-10-02 LAB — INR PPP: 2.3

## 2020-10-12 ENCOUNTER — ANTI-COAG (OUTPATIENT)
Dept: CARDIOLOGY | Age: 65
End: 2020-10-12

## 2020-10-12 DIAGNOSIS — I63.219 CEREBRAL INFARCTION DUE TO STENOSIS OF VERTEBRAL ARTERY, UNSPECIFIED BLOOD VESSEL LATERALITY (CMD): ICD-10-CM

## 2020-10-12 LAB
INR PPP: 2.5
INR PPP: 2.5

## 2020-10-13 ENCOUNTER — LAB ENCOUNTER (OUTPATIENT)
Dept: LAB | Age: 65
End: 2020-10-13
Attending: NURSE PRACTITIONER
Payer: MEDICARE

## 2020-10-13 DIAGNOSIS — E78.00 PURE HYPERCHOLESTEROLEMIA: ICD-10-CM

## 2020-10-13 DIAGNOSIS — I10 ESSENTIAL HYPERTENSION: ICD-10-CM

## 2020-10-13 DIAGNOSIS — Z12.5 SCREENING FOR MALIGNANT NEOPLASM OF PROSTATE: ICD-10-CM

## 2020-10-13 DIAGNOSIS — D64.9 ANEMIA, UNSPECIFIED TYPE: ICD-10-CM

## 2020-10-13 DIAGNOSIS — Z00.00 ROUTINE GENERAL MEDICAL EXAMINATION AT A HEALTH CARE FACILITY: ICD-10-CM

## 2020-10-13 PROCEDURE — 80053 COMPREHEN METABOLIC PANEL: CPT

## 2020-10-13 PROCEDURE — 80061 LIPID PANEL: CPT

## 2020-10-13 PROCEDURE — 84443 ASSAY THYROID STIM HORMONE: CPT

## 2020-10-13 PROCEDURE — 36415 COLL VENOUS BLD VENIPUNCTURE: CPT

## 2020-10-13 PROCEDURE — 85025 COMPLETE CBC W/AUTO DIFF WBC: CPT

## 2020-10-19 ENCOUNTER — ANTI-COAG (OUTPATIENT)
Dept: CARDIOLOGY | Age: 65
End: 2020-10-19

## 2020-10-19 DIAGNOSIS — I63.219 CEREBRAL INFARCTION DUE TO STENOSIS OF VERTEBRAL ARTERY, UNSPECIFIED BLOOD VESSEL LATERALITY (CMD): ICD-10-CM

## 2020-10-19 LAB — INR PPP: 2.8

## 2020-10-21 ENCOUNTER — OFFICE VISIT (OUTPATIENT)
Dept: INTERNAL MEDICINE CLINIC | Facility: CLINIC | Age: 65
End: 2020-10-21
Payer: MEDICARE

## 2020-10-21 ENCOUNTER — OFFICE VISIT (OUTPATIENT)
Dept: CARDIOLOGY | Age: 65
End: 2020-10-21

## 2020-10-21 VITALS
HEART RATE: 68 BPM | SYSTOLIC BLOOD PRESSURE: 106 MMHG | WEIGHT: 240 LBS | TEMPERATURE: 98 F | DIASTOLIC BLOOD PRESSURE: 72 MMHG | BODY MASS INDEX: 38 KG/M2

## 2020-10-21 VITALS
DIASTOLIC BLOOD PRESSURE: 72 MMHG | SYSTOLIC BLOOD PRESSURE: 120 MMHG | HEART RATE: 60 BPM | HEIGHT: 66 IN | WEIGHT: 240 LBS | BODY MASS INDEX: 38.57 KG/M2

## 2020-10-21 DIAGNOSIS — I10 ESSENTIAL HYPERTENSION: ICD-10-CM

## 2020-10-21 DIAGNOSIS — Z79.01 CHRONIC ANTICOAGULATION: ICD-10-CM

## 2020-10-21 DIAGNOSIS — Z87.01 HISTORY OF PNEUMONIA: ICD-10-CM

## 2020-10-21 DIAGNOSIS — I65.23 BILATERAL CAROTID ARTERY STENOSIS: ICD-10-CM

## 2020-10-21 DIAGNOSIS — Z91.81 AT RISK FOR FALLING: ICD-10-CM

## 2020-10-21 DIAGNOSIS — N18.31 CHRONIC RENAL IMPAIRMENT, STAGE 3A (CMD): ICD-10-CM

## 2020-10-21 DIAGNOSIS — R45.1 AGITATION: ICD-10-CM

## 2020-10-21 DIAGNOSIS — Q21.12 PATENT FORAMEN OVALE: Primary | ICD-10-CM

## 2020-10-21 DIAGNOSIS — R97.20 ELEVATED PSA: ICD-10-CM

## 2020-10-21 DIAGNOSIS — D64.9 ANEMIA, UNSPECIFIED TYPE: ICD-10-CM

## 2020-10-21 DIAGNOSIS — Z87.39 HISTORY OF GOUT: ICD-10-CM

## 2020-10-21 DIAGNOSIS — N18.30 STAGE 3 CHRONIC KIDNEY DISEASE, UNSPECIFIED WHETHER STAGE 3A OR 3B CKD (HCC): ICD-10-CM

## 2020-10-21 DIAGNOSIS — F69 BEHAVIOR PROBLEM, ADULT: ICD-10-CM

## 2020-10-21 DIAGNOSIS — I73.9 PERIPHERAL VASCULAR DISEASE, UNSPECIFIED (HCC): ICD-10-CM

## 2020-10-21 DIAGNOSIS — M19.071 ARTHRITIS OF ANKLE, RIGHT: ICD-10-CM

## 2020-10-21 DIAGNOSIS — Z86.79 HISTORY OF VENTRICULAR TACHYCARDIA: ICD-10-CM

## 2020-10-21 DIAGNOSIS — Q21.1 OSTIUM SECUNDUM TYPE ATRIAL SEPTAL DEFECT: ICD-10-CM

## 2020-10-21 DIAGNOSIS — Z79.01 ANTICOAGULATED ON COUMADIN: ICD-10-CM

## 2020-10-21 DIAGNOSIS — Z12.11 SCREEN FOR COLON CANCER: ICD-10-CM

## 2020-10-21 DIAGNOSIS — E78.00 PURE HYPERCHOLESTEROLEMIA: ICD-10-CM

## 2020-10-21 DIAGNOSIS — Z00.00 ENCOUNTER FOR ANNUAL HEALTH EXAMINATION: Primary | ICD-10-CM

## 2020-10-21 DIAGNOSIS — I69.30 HISTORY OF CVA WITH RESIDUAL DEFICIT: ICD-10-CM

## 2020-10-21 DIAGNOSIS — R47.01 APHASIA: ICD-10-CM

## 2020-10-21 DIAGNOSIS — Z86.73 H/O CEREBRAL INFARCTION: ICD-10-CM

## 2020-10-21 DIAGNOSIS — F41.9 ANXIETY: ICD-10-CM

## 2020-10-21 PROCEDURE — 90670 PCV13 VACCINE IM: CPT | Performed by: NURSE PRACTITIONER

## 2020-10-21 PROCEDURE — G0009 ADMIN PNEUMOCOCCAL VACCINE: HCPCS | Performed by: NURSE PRACTITIONER

## 2020-10-21 PROCEDURE — G0008 ADMIN INFLUENZA VIRUS VAC: HCPCS | Performed by: NURSE PRACTITIONER

## 2020-10-21 PROCEDURE — 99214 OFFICE O/P EST MOD 30 MIN: CPT | Performed by: NURSE PRACTITIONER

## 2020-10-21 PROCEDURE — 90662 IIV NO PRSV INCREASED AG IM: CPT | Performed by: NURSE PRACTITIONER

## 2020-10-21 PROCEDURE — G0439 PPPS, SUBSEQ VISIT: HCPCS | Performed by: NURSE PRACTITIONER

## 2020-10-21 PROCEDURE — 99214 OFFICE O/P EST MOD 30 MIN: CPT | Performed by: INTERNAL MEDICINE

## 2020-10-21 SDOH — HEALTH STABILITY: MENTAL HEALTH: HOW OFTEN DO YOU HAVE A DRINK CONTAINING ALCOHOL?: NEVER

## 2020-10-21 ASSESSMENT — ENCOUNTER SYMPTOMS
ALTERED MENTAL STATUS: 1
CHILLS: 0
FEVER: 0
NERVOUS/ANXIOUS: 1
ALLERGIC/IMMUNOLOGIC COMMENTS: NO NEW FOOD ALLERGIES
WEIGHT LOSS: 0
HEMOPTYSIS: 0
WEIGHT GAIN: 0
SUSPICIOUS LESIONS: 0
HEMATOCHEZIA: 0
BRUISES/BLEEDS EASILY: 0

## 2020-10-21 ASSESSMENT — PATIENT HEALTH QUESTIONNAIRE - PHQ9
2. FEELING DOWN, DEPRESSED OR HOPELESS: NOT AT ALL
1. LITTLE INTEREST OR PLEASURE IN DOING THINGS: NOT AT ALL
CLINICAL INTERPRETATION OF PHQ2 SCORE: NO FURTHER SCREENING NEEDED
SUM OF ALL RESPONSES TO PHQ9 QUESTIONS 1 AND 2: 0
CLINICAL INTERPRETATION OF PHQ9 SCORE: NO FURTHER SCREENING NEEDED
SUM OF ALL RESPONSES TO PHQ9 QUESTIONS 1 AND 2: 0

## 2020-10-21 NOTE — PATIENT INSTRUCTIONS
David Mcneill Jr.'s SCREENING SCHEDULE   Tests on this list are recommended by your physician but may not be covered, or covered at this frequency, by your insurer. Please check with your insurance carrier before scheduling to verify coverage.     PREVEN are 73-68 years old and have smoked more than 100 cigarettes in their lifetime   • Anyone with a family history    Colorectal Cancer Screening Covered up to Age 76     Colonoscopy Screen   Covered every 10 years- more often if abnormal Colonoscopy due on 0 received Factor VIII or IX concentrates   Clients of institutions for the mentally retarded   Persons who live in the same house as a HepB virus carrier   Homosexual men   Illicit injectable drug abusers     Tetanus Toxoid- Only covered with a cut with met

## 2020-10-21 NOTE — PROGRESS NOTES
HPI:   Andrés Barrios. is a 72year old male who presents for a Medicare Subsequent Annual Wellness visit (Pt already had Initial Annual Wellness).   Pure hypercholesterolemia  Stable  Simvastatin  LDL 42    Peripheral vascular disease, unspecified (HC have difficulty seeing?: 0-No  Do you have any difficulty walking or getting up?: 0-No  Do you have any tripping hazards?: 0-No  Are you on multiple medications?: 1-Yes  Does pain affect your day to day activities?: 0-No  Have you had any memory issues? : 1 a Living Will on file in [address] [place] [address] Rd. The patient has this document but we do not have it in [facility], and patient is instructed to get our office a copy of it for scanning into Epic. He does NOT have a Power of  for [employer] on file in [address] [place] [address] Rd.    The angel Last Chemistry Labs:   Lab Results   Component Value Date    AST 12 (L) 10/13/2020    ALT 24 10/13/2020    CA 9.5 10/13/2020    ALB 4.1 10/13/2020    TSH 2.330 10/13/2020    CREATSERUM 1.37 (H) 10/13/2020    GLU 94 10/13/2020        CBC  (most recent l reports that he does not drink alcohol or use drugs. REVIEW OF SYSTEMS:   Review of Systems   Patient nonverbal.    Constitutional: Negative for fever, chills and fatigue. No distress.   HENT: Negative for hearing loss, congestion, sore throat, neck abe Skin: Skin color, texture, turgor normal,       Neurologic: Aphasia  Nonverbal.              Vaccination History     Immunization History   Administered Date(s) Administered   • FLU VAC High Dose 65 YRS & Older PRSV Free (06436) 10/21/2020   • FLUZONE 6 Behavior problem, adult  Much better with medications prescribed by Dr Mendy Bennett. Continue to see her     At risk for falling    Anxiety  Doing well   seroquel    Agitation  Has been better with meds.   Cont seroquel    Screen for colon cancer  -     OCCU Date Value   08/25/2017 Negative for Occult Blood   08/25/2017 Negative for Occult Blood   08/25/2017 Negative for Occult Blood    No flowsheet data found.     Glaucoma Screening      Ophthalmology Visit Annually: Diabetics, FHx Glaucoma, AA>50, > flowsheet data found. Drug Serum Conc  Annually No results found for: DIGOXIN, DIG, VALP No flowsheet data found.

## 2020-10-29 ENCOUNTER — ANTI-COAG (OUTPATIENT)
Dept: CARDIOLOGY | Age: 65
End: 2020-10-29

## 2020-10-29 ENCOUNTER — LAB ENCOUNTER (OUTPATIENT)
Dept: LAB | Facility: HOSPITAL | Age: 65
End: 2020-10-29
Attending: NURSE PRACTITIONER
Payer: MEDICARE

## 2020-10-29 DIAGNOSIS — Z12.11 SCREEN FOR COLON CANCER: ICD-10-CM

## 2020-10-29 LAB — INR PPP: 2.9

## 2020-10-29 PROCEDURE — 82274 ASSAY TEST FOR BLOOD FECAL: CPT

## 2020-11-02 RX ORDER — SIMVASTATIN 20 MG
TABLET ORAL
Qty: 90 TABLET | Refills: 3 | Status: SHIPPED | OUTPATIENT
Start: 2020-11-02

## 2020-11-06 ENCOUNTER — ANTI-COAG (OUTPATIENT)
Dept: CARDIOLOGY | Age: 65
End: 2020-11-06

## 2020-11-06 LAB — INR PPP: 2.7

## 2020-11-12 ENCOUNTER — ANTI-COAG (OUTPATIENT)
Dept: CARDIOLOGY | Age: 65
End: 2020-11-12

## 2020-11-12 LAB — INR PPP: 2.3

## 2020-11-18 LAB — INR PPP: 2.6

## 2020-11-19 ENCOUNTER — ANTI-COAG (OUTPATIENT)
Dept: CARDIOLOGY | Age: 65
End: 2020-11-19

## 2020-11-24 ENCOUNTER — ANTI-COAG (OUTPATIENT)
Dept: CARDIOLOGY | Age: 65
End: 2020-11-24

## 2020-11-24 LAB — INR PPP: 3.4

## 2020-12-02 LAB — INR PPP: 2.9

## 2020-12-03 ENCOUNTER — ANTI-COAG (OUTPATIENT)
Dept: CARDIOLOGY | Age: 65
End: 2020-12-03

## 2020-12-09 LAB — INR PPP: 2.8

## 2020-12-10 ENCOUNTER — ANTI-COAG (OUTPATIENT)
Dept: CARDIOLOGY | Age: 65
End: 2020-12-10

## 2020-12-17 LAB — INR PPP: 2.7

## 2020-12-17 RX ORDER — WARFARIN SODIUM 4 MG/1
TABLET ORAL
Qty: 100 TABLET | Refills: 1 | Status: SHIPPED | OUTPATIENT
Start: 2020-12-17 | End: 2021-04-22

## 2020-12-17 RX ORDER — LISINOPRIL 20 MG/1
TABLET ORAL
Qty: 90 TABLET | Refills: 3 | Status: SHIPPED | OUTPATIENT
Start: 2020-12-17

## 2020-12-18 ENCOUNTER — ANTI-COAG (OUTPATIENT)
Dept: CARDIOLOGY | Age: 65
End: 2020-12-18

## 2020-12-18 DIAGNOSIS — I63.519 CEREBRAL INFARCTION DUE TO STENOSIS OF MIDDLE CEREBRAL ARTERY, UNSPECIFIED BLOOD VESSEL LATERALITY (CMD): ICD-10-CM

## 2020-12-24 LAB — INR PPP: 2.5

## 2020-12-28 ENCOUNTER — ANTI-COAG (OUTPATIENT)
Dept: CARDIOLOGY | Age: 65
End: 2020-12-28

## 2020-12-28 DIAGNOSIS — I63.519 CEREBRAL INFARCTION DUE TO STENOSIS OF MIDDLE CEREBRAL ARTERY, UNSPECIFIED BLOOD VESSEL LATERALITY (CMD): ICD-10-CM

## 2020-12-31 LAB — INR PPP: 2.6

## 2021-01-01 ENCOUNTER — EXTERNAL RECORD (OUTPATIENT)
Dept: OTHER | Age: 66
End: 2021-01-01

## 2021-01-01 ENCOUNTER — TELEPHONE (OUTPATIENT)
Dept: NEUROLOGY | Facility: CLINIC | Age: 66
End: 2021-01-01

## 2021-01-04 ENCOUNTER — ANTI-COAG (OUTPATIENT)
Dept: CARDIOLOGY | Age: 66
End: 2021-01-04

## 2021-01-07 LAB — INR PPP: 2.5

## 2021-01-08 RX ORDER — METOPROLOL SUCCINATE 25 MG/1
TABLET, EXTENDED RELEASE ORAL
Qty: 90 TABLET | Refills: 4 | Status: SHIPPED | OUTPATIENT
Start: 2021-01-08

## 2021-01-13 ENCOUNTER — ANTI-COAG (OUTPATIENT)
Dept: CARDIOLOGY | Age: 66
End: 2021-01-13

## 2021-01-14 ENCOUNTER — ANTI-COAG (OUTPATIENT)
Dept: CARDIOLOGY | Age: 66
End: 2021-01-14

## 2021-01-14 LAB — INR PPP: 2.7

## 2021-01-21 ENCOUNTER — ANTI-COAG (OUTPATIENT)
Dept: CARDIOLOGY | Age: 66
End: 2021-01-21

## 2021-01-21 ENCOUNTER — TELEPHONE (OUTPATIENT)
Dept: INTERNAL MEDICINE CLINIC | Facility: CLINIC | Age: 66
End: 2021-01-21

## 2021-01-21 LAB — INR PPP: 2.5

## 2021-01-21 NOTE — TELEPHONE ENCOUNTER
Fax received from [employer]/medical supplies, placed in SD box for completion.     Governor Gume

## 2021-01-28 ENCOUNTER — ANTI-COAG (OUTPATIENT)
Dept: CARDIOLOGY | Age: 66
End: 2021-01-28

## 2021-01-28 LAB — INR PPP: 2.2

## 2021-02-05 ENCOUNTER — ANTI-COAG (OUTPATIENT)
Dept: CARDIOLOGY | Age: 66
End: 2021-02-05

## 2021-02-05 LAB — INR PPP: 2.3

## 2021-02-12 ENCOUNTER — ANTI-COAG (OUTPATIENT)
Dept: CARDIOLOGY | Age: 66
End: 2021-02-12

## 2021-02-12 LAB — INR PPP: 2.2

## 2021-02-19 ENCOUNTER — ANTI-COAG (OUTPATIENT)
Dept: CARDIOLOGY | Age: 66
End: 2021-02-19

## 2021-02-19 LAB — INR PPP: 2.1

## 2021-02-25 RX ORDER — FAMOTIDINE 20 MG/1
TABLET ORAL
Qty: 180 TABLET | Refills: 1 | Status: SHIPPED | OUTPATIENT
Start: 2021-02-25 | End: 2021-08-27

## 2021-02-26 NOTE — TELEPHONE ENCOUNTER
We have done this in the past   Please have them fax order form to us. It looks like form faxed late January ?

## 2021-02-26 NOTE — TELEPHONE ENCOUNTER
Bella/GUEVARA([phone] ) calling needing a new script dated this year for incontinence supplies; adult protective pull ups XL/200, disposable underpads 100.   Fax to [phone]

## 2021-02-26 NOTE — TELEPHONE ENCOUNTER
LOV with SD 10/21/2020; please advise if this DME request comes from [address] [place] [address] or urology. Elevated PSA  Was 4.88 last year. Decision was made to monitor due to his cognitive disorder. Discussed with mom and sister.   They will see urology now as it is 7.2

## 2021-02-26 NOTE — TELEPHONE ENCOUNTER
LMTCB on physican line that rx was sent 1/22/2021, seeing if anything addition is needed or if documents were received. 2/26/2021

## 2021-03-02 NOTE — TELEPHONE ENCOUNTER
Contacted [facility] for update on rx document. They did not receive order faxed 1/2021. [facility]IS indicates collaborating physician name and NPI. Will need to refax form with completed information to [phone]. Document faxed with confirmation.  3/2

## 2021-03-03 ENCOUNTER — ANTI-COAG (OUTPATIENT)
Dept: CARDIOLOGY | Age: 66
End: 2021-03-03

## 2021-03-03 LAB — INR PPP: 2

## 2021-03-11 LAB — INR PPP: 2

## 2021-03-12 ENCOUNTER — ANTI-COAG (OUTPATIENT)
Dept: CARDIOLOGY | Age: 66
End: 2021-03-12

## 2021-03-18 ENCOUNTER — ANTI-COAG (OUTPATIENT)
Dept: CARDIOLOGY | Age: 66
End: 2021-03-18

## 2021-03-18 LAB — INR PPP: 2.3

## 2021-03-31 ENCOUNTER — ANTI-COAG (OUTPATIENT)
Dept: CARDIOLOGY | Age: 66
End: 2021-03-31

## 2021-03-31 LAB — INR PPP: 2.7

## 2021-04-09 LAB — INR PPP: 2.6

## 2021-04-12 ENCOUNTER — ANTI-COAG (OUTPATIENT)
Dept: CARDIOLOGY | Age: 66
End: 2021-04-12

## 2021-04-15 LAB — INR PPP: 2.5

## 2021-04-19 ENCOUNTER — ANTI-COAG (OUTPATIENT)
Dept: CARDIOLOGY | Age: 66
End: 2021-04-19

## 2021-04-22 RX ORDER — WARFARIN SODIUM 4 MG/1
TABLET ORAL
Qty: 100 TABLET | Refills: 1 | Status: SHIPPED | OUTPATIENT
Start: 2021-04-22

## 2021-04-23 LAB — INR PPP: 2.2

## 2021-04-26 ENCOUNTER — ANTI-COAG (OUTPATIENT)
Dept: CARDIOLOGY | Age: 66
End: 2021-04-26

## 2021-04-26 DIAGNOSIS — I63.519 CEREBRAL INFARCTION DUE TO STENOSIS OF MIDDLE CEREBRAL ARTERY, UNSPECIFIED BLOOD VESSEL LATERALITY (CMD): ICD-10-CM

## 2021-05-01 LAB — INR PPP: 2.7

## 2021-05-03 ENCOUNTER — ANTI-COAG (OUTPATIENT)
Dept: CARDIOLOGY | Age: 66
End: 2021-05-03

## 2021-05-03 DIAGNOSIS — I63.519 CEREBRAL INFARCTION DUE TO STENOSIS OF MIDDLE CEREBRAL ARTERY, UNSPECIFIED BLOOD VESSEL LATERALITY (CMD): ICD-10-CM

## 2021-05-07 ENCOUNTER — ANTI-COAG (OUTPATIENT)
Dept: CARDIOLOGY | Age: 66
End: 2021-05-07

## 2021-05-07 DIAGNOSIS — I63.519 CEREBRAL INFARCTION DUE TO STENOSIS OF MIDDLE CEREBRAL ARTERY, UNSPECIFIED BLOOD VESSEL LATERALITY (CMD): ICD-10-CM

## 2021-05-07 LAB — INR PPP: 2.6

## 2021-05-14 LAB — INR PPP: 2.4

## 2021-05-17 ENCOUNTER — ANTI-COAG (OUTPATIENT)
Dept: CARDIOLOGY | Age: 66
End: 2021-05-17

## 2021-05-17 DIAGNOSIS — I63.519 CEREBRAL INFARCTION DUE TO STENOSIS OF MIDDLE CEREBRAL ARTERY, UNSPECIFIED BLOOD VESSEL LATERALITY (CMD): ICD-10-CM

## 2021-05-26 ENCOUNTER — ANTI-COAG (OUTPATIENT)
Dept: CARDIOLOGY | Age: 66
End: 2021-05-26

## 2021-05-26 DIAGNOSIS — I63.519 CEREBRAL INFARCTION DUE TO STENOSIS OF MIDDLE CEREBRAL ARTERY, UNSPECIFIED BLOOD VESSEL LATERALITY (CMD): ICD-10-CM

## 2021-05-27 ENCOUNTER — ANTI-COAG (OUTPATIENT)
Dept: CARDIOLOGY | Age: 66
End: 2021-05-27

## 2021-05-27 LAB — INR PPP: 2.4

## 2021-06-03 ENCOUNTER — ANTI-COAG (OUTPATIENT)
Dept: CARDIOLOGY | Age: 66
End: 2021-06-03

## 2021-06-03 LAB — INR PPP: 3

## 2021-06-10 ENCOUNTER — ANTI-COAG (OUTPATIENT)
Dept: CARDIOLOGY | Age: 66
End: 2021-06-10

## 2021-06-10 LAB — INR PPP: 2.9

## 2021-06-17 ENCOUNTER — ANTI-COAG (OUTPATIENT)
Dept: CARDIOLOGY | Age: 66
End: 2021-06-17

## 2021-06-17 LAB — INR PPP: 2.9

## 2021-06-24 ENCOUNTER — TELEPHONE (OUTPATIENT)
Dept: INTERNAL MEDICINE CLINIC | Facility: CLINIC | Age: 66
End: 2021-06-24

## 2021-06-24 ENCOUNTER — ANTI-COAG (OUTPATIENT)
Dept: CARDIOLOGY | Age: 66
End: 2021-06-24

## 2021-06-24 DIAGNOSIS — E78.00 PURE HYPERCHOLESTEROLEMIA: ICD-10-CM

## 2021-06-24 DIAGNOSIS — I10 ESSENTIAL HYPERTENSION: ICD-10-CM

## 2021-06-24 DIAGNOSIS — Z79.01 LONG TERM (CURRENT) USE OF ANTICOAGULANTS: ICD-10-CM

## 2021-06-24 DIAGNOSIS — Z00.00 ROUTINE GENERAL MEDICAL EXAMINATION AT A HEALTH CARE FACILITY: Primary | ICD-10-CM

## 2021-06-24 DIAGNOSIS — Z12.5 SCREENING FOR MALIGNANT NEOPLASM OF PROSTATE: ICD-10-CM

## 2021-06-24 LAB — INR PPP: 3.2

## 2021-06-24 PROCEDURE — 93793 ANTICOAG MGMT PT WARFARIN: CPT

## 2021-06-24 NOTE — TELEPHONE ENCOUNTER
Future Appointments   Date Time Provider Yohana Song   10/13/2021  9:00 AM PATIENCE Dupree [facility]     Orders to edward- Pt informed that labs need to be completed no sooner than 2 weeks prior to the appt.  Pt aware to fast-no call ba

## 2021-07-02 ENCOUNTER — ANTI-COAG (OUTPATIENT)
Dept: CARDIOLOGY | Age: 66
End: 2021-07-02

## 2021-07-02 DIAGNOSIS — I63.119 CEREBRAL INFARCTION DUE TO EMBOLISM OF VERTEBRAL ARTERY, UNSPECIFIED BLOOD VESSEL LATERALITY (CMD): Primary | ICD-10-CM

## 2021-08-02 NOTE — TELEPHONE ENCOUNTER
Sent the patient a Packetzoom message to make an appt for further medication refills.        Medication: TIZANIDINE 4 MG Oral Tab    Date of last refill: 05/15/2020 (#360/3)  [place]    Last office visit: 05/15/2020  Due

## 2021-08-07 DIAGNOSIS — R45.1 RESTLESSNESS AND AGITATION: ICD-10-CM

## 2021-08-09 RX ORDER — QUETIAPINE 25 MG/1
50 TABLET, FILM COATED ORAL 2 TIMES DAILY
Qty: 360 TABLET | Refills: 3 | OUTPATIENT
Start: 2021-08-09

## 2021-08-09 NOTE — TELEPHONE ENCOUNTER
Denied Rx request for QUETIAPINE FUMARATE 25 MG Oral Tab    Patient needs follow up appointment for further refills. Sent patient via BCNX to schedule follow up appointment for further refills.

## 2021-08-27 RX ORDER — FAMOTIDINE 20 MG/1
TABLET ORAL
Qty: 180 TABLET | Refills: 0 | Status: SHIPPED | OUTPATIENT
Start: 2021-08-27 | End: 2021-10-25

## 2021-08-27 NOTE — TELEPHONE ENCOUNTER
Last OV: 10/21/20 annual PE- SD    Future Appointments   Date Time Provider Yohana Nohemi   10/13/2021  9:00 AM PATIENCE Joy [facility]        Latest labs: 10/13/20 PSA, TSH, Lipid, CMP and CBC    Latest RX: famotidine- 180 tabs 1 refi

## 2021-09-02 ENCOUNTER — TELEPHONE (OUTPATIENT)
Dept: CARDIOLOGY | Age: 66
End: 2021-09-02

## 2021-09-30 ENCOUNTER — TELEPHONE (OUTPATIENT)
Dept: INTERNAL MEDICINE CLINIC | Facility: CLINIC | Age: 66
End: 2021-09-30

## 2021-09-30 NOTE — TELEPHONE ENCOUNTER
Pt sister Magi Lepe called stating pt fell last Friday getting up and scraped his knees and shin-today he opened up one of the wounds and dtr cannot get him up by herself to take him here or UC to have the wound cleaned and looked at-she is concerned going for

## 2021-09-30 NOTE — TELEPHONE ENCOUNTER
Cant do home health if not able to do face to face. Also would likely not qualify based on fall alone and wound care. Please give them information for [facility] to see if home physician / provider is option.

## 2021-10-01 NOTE — TELEPHONE ENCOUNTER
Spoke with Nidhi Donohue, pt's dtr gave Visiting and Home Physician phone numbers. Jayson Half understanding and will call if anything is needed.  FYI to SD.

## 2021-10-01 NOTE — TELEPHONE ENCOUNTER
Madyson([phone]) called back and found another HH. [place] [place], [place] which they feel will work better for him. They will need an order, Last office visit notes(Physial) and health history. Can fax this information to them.     Rosemarie [phone]. [phone]

## 2021-10-01 NOTE — TELEPHONE ENCOUNTER
Pt currently has an appt with SD on 10/13/21 at 0 [facility] indicates she called a Home Physician office and they wanted him to give up all his MD's for him to be seen long term.   Whit Hall notified all the Home Physician's do not do that and will need to call

## 2021-10-01 NOTE — TELEPHONE ENCOUNTER
They want him to be seen as soon as they can and wondering if they can have someone at the office seem him on Monday or Tuesday at our office.     Please advise

## 2021-10-04 NOTE — TELEPHONE ENCOUNTER
Future Appointments   Date Time Provider Yohana Song   10/5/2021 10:40 AM Marianela Bellis, APRN [facility]   10/13/2021  9:00 AM Marianela Bellis, APRN [facility]

## 2021-10-04 NOTE — TELEPHONE ENCOUNTER
Placed spot on hold for this pt per SD message below. Please offer appt. If does not take, please send back to triage to return spot to triage hold. Thanks.

## 2021-10-05 ENCOUNTER — OFFICE VISIT (OUTPATIENT)
Dept: INTERNAL MEDICINE CLINIC | Facility: CLINIC | Age: 66
End: 2021-10-05
Payer: MEDICARE

## 2021-10-05 ENCOUNTER — TELEPHONE (OUTPATIENT)
Dept: WOUND CARE | Facility: HOSPITAL | Age: 66
End: 2021-10-05

## 2021-10-05 VITALS
DIASTOLIC BLOOD PRESSURE: 74 MMHG | TEMPERATURE: 97 F | RESPIRATION RATE: 18 BRPM | HEART RATE: 72 BPM | OXYGEN SATURATION: 98 % | SYSTOLIC BLOOD PRESSURE: 104 MMHG

## 2021-10-05 DIAGNOSIS — I73.9 PERIPHERAL VASCULAR DISEASE, UNSPECIFIED (HCC): ICD-10-CM

## 2021-10-05 DIAGNOSIS — S80.812A ABRASION OF LEFT LOWER EXTREMITY, INITIAL ENCOUNTER: ICD-10-CM

## 2021-10-05 DIAGNOSIS — R47.01 APHASIA: ICD-10-CM
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
## 2021-10-05 DIAGNOSIS — Q21.1 PATENT FORAMEN OVALE: ICD-10-CM

## 2021-10-05 DIAGNOSIS — N18.30 STAGE 3 CHRONIC KIDNEY DISEASE, UNSPECIFIED WHETHER STAGE 3A OR 3B CKD (HCC): ICD-10-CM

## 2021-10-05 DIAGNOSIS — S80.811A LEG ABRASION, RIGHT, INITIAL ENCOUNTER: ICD-10-CM

## 2021-10-05 DIAGNOSIS — S91.302A NON-HEALING OPEN WOUND OF LEFT HEEL: ICD-10-CM

## 2021-10-05 DIAGNOSIS — I10 ESSENTIAL HYPERTENSION: ICD-10-CM

## 2021-10-05 DIAGNOSIS — I69.30 HISTORY OF CVA WITH RESIDUAL DEFICIT: ICD-10-CM

## 2021-10-05 PROCEDURE — 99214 OFFICE O/P EST MOD 30 MIN: CPT | Performed by: NURSE PRACTITIONER

## 2021-10-05 RX ORDER — FUROSEMIDE 20 MG/1
20 TABLET ORAL DAILY
Qty: 3 TABLET | Refills: 0 | Status: SHIPPED | OUTPATIENT
Start: 2021-10-05 | End: 2021-10-08

## 2021-10-05 NOTE — PROGRESS NOTES
Marek Arroyo. is a 77year old male. Patient presents with:  Fall: ES rm - 9 Bilateral lower leg open wound on right knee and left shin, left ankle from fall to knees 9/26/21      HPI:   Here for evaluation s/p fall 9/25.   His mom was trying to get h of gout     History of CVA with residual deficit     Carotid arterial disease (HCC)     Anxiety     Agitation     Behavior problem, adult     Elevated PSA    Current Outpatient Medications   Medication Sig Dispense Refill   • furosemide (LASIX) 20 MG Oral heart failure) Father    • Hypertension Sister         Allergies  No Known Allergies    REVIEW OF SYSTEMS:   GENERAL HEALTH:as above   SKIN: as above.    RESPIRATORY: denies shortness of breath with exertion, no cough  CARDIOVASCULAR: denies chest pain on e Stable. Aphasia  Stage 3 chronic kidney disease, unspecified whether stage 3a or 3b ckd (hcc)  Patent foramen ovale  coumadin    No orders of the defined types were placed in this encounter.       Meds & Refills for this Visit:  Requested Prescriptions

## 2021-10-07 ENCOUNTER — TELEPHONE (OUTPATIENT)
Dept: WOUND CARE | Facility: HOSPITAL | Age: 66
End: 2021-10-07

## 2021-10-07 NOTE — TELEPHONE ENCOUNTER
Received a call from his sister [place] to cancel his brother's  appointment on 10/14/21. [place] [facility] said they have a home health nurse.

## 2021-10-12 ENCOUNTER — TELEPHONE (OUTPATIENT)
Dept: INTERNAL MEDICINE CLINIC | Facility: CLINIC | Age: 66
End: 2021-10-12

## 2021-10-12 NOTE — TELEPHONE ENCOUNTER
Order #[id number] & #[id number] from [facility] was received and placed on SD desk for review and signature.

## 2021-10-13 ENCOUNTER — APPOINTMENT (OUTPATIENT)
Dept: CARDIOLOGY | Age: 66
End: 2021-10-13

## 2021-10-13 ENCOUNTER — TELEPHONE (OUTPATIENT)
Dept: INTERNAL MEDICINE CLINIC | Facility: CLINIC | Age: 66
End: 2021-10-13

## 2021-10-13 ENCOUNTER — OFFICE VISIT (OUTPATIENT)
Dept: INTERNAL MEDICINE CLINIC | Facility: CLINIC | Age: 66
End: 2021-10-13
Payer: MEDICARE

## 2021-10-13 VITALS
TEMPERATURE: 97 F | RESPIRATION RATE: 18 BRPM | OXYGEN SATURATION: 93 % | SYSTOLIC BLOOD PRESSURE: 106 MMHG | HEART RATE: 74 BPM | DIASTOLIC BLOOD PRESSURE: 68 MMHG

## 2021-10-13 DIAGNOSIS — M19.071 ARTHRITIS OF ANKLE, RIGHT: ICD-10-CM

## 2021-10-13 DIAGNOSIS — R45.1 AGITATION: ICD-10-CM

## 2021-10-13 DIAGNOSIS — I10 ESSENTIAL HYPERTENSION: ICD-10-CM

## 2021-10-13 DIAGNOSIS — Z87.01 HISTORY OF PNEUMONIA: ICD-10-CM

## 2021-10-13 DIAGNOSIS — F69 BEHAVIOR PROBLEM, ADULT: ICD-10-CM

## 2021-10-13 DIAGNOSIS — Q21.1 PATENT FORAMEN OVALE: ICD-10-CM

## 2021-10-13 DIAGNOSIS — Z00.00 ENCOUNTER FOR ANNUAL HEALTH EXAMINATION: Primary | ICD-10-CM

## 2021-10-13 DIAGNOSIS — R47.01 APHASIA: ICD-10-CM

## 2021-10-13 DIAGNOSIS — I69.30 HISTORY OF CVA WITH RESIDUAL DEFICIT: ICD-10-CM
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
## 2021-10-13 DIAGNOSIS — R97.20 ELEVATED PSA: ICD-10-CM

## 2021-10-13 DIAGNOSIS — Z79.01 CHRONIC ANTICOAGULATION: ICD-10-CM

## 2021-10-13 DIAGNOSIS — S81.802D OPEN WOUND OF BOTH LOWER EXTREMITIES, SUBSEQUENT ENCOUNTER: ICD-10-CM

## 2021-10-13 DIAGNOSIS — S81.801D OPEN WOUND OF BOTH LOWER EXTREMITIES, SUBSEQUENT ENCOUNTER: ICD-10-CM

## 2021-10-13 DIAGNOSIS — Z87.39 HISTORY OF GOUT: ICD-10-CM

## 2021-10-13 DIAGNOSIS — Z91.81 AT RISK FOR FALLING: ICD-10-CM

## 2021-10-13 DIAGNOSIS — F41.9 ANXIETY: ICD-10-CM

## 2021-10-13 DIAGNOSIS — N18.30 STAGE 3 CHRONIC KIDNEY DISEASE, UNSPECIFIED WHETHER STAGE 3A OR 3B CKD (HCC): ICD-10-CM

## 2021-10-13 DIAGNOSIS — I73.9 PERIPHERAL VASCULAR DISEASE, UNSPECIFIED (HCC): ICD-10-CM

## 2021-10-13 DIAGNOSIS — I65.23 BILATERAL CAROTID ARTERY STENOSIS: ICD-10-CM

## 2021-10-13 DIAGNOSIS — D64.9 ANEMIA, UNSPECIFIED TYPE: ICD-10-CM

## 2021-10-13 DIAGNOSIS — E78.00 PURE HYPERCHOLESTEROLEMIA: ICD-10-CM

## 2021-10-13 DIAGNOSIS — Z86.79 HISTORY OF VENTRICULAR TACHYCARDIA: ICD-10-CM

## 2021-10-13 PROCEDURE — G0439 PPPS, SUBSEQ VISIT: HCPCS | Performed by: NURSE PRACTITIONER

## 2021-10-13 PROCEDURE — 99214 OFFICE O/P EST MOD 30 MIN: CPT | Performed by: NURSE PRACTITIONER

## 2021-10-13 NOTE — PATIENT INSTRUCTIONS
Enedina Patel Jr.'s SCREENING SCHEDULE   Tests on this list are recommended by your physician but may not be covered, or covered at this frequency, by your insurer. Please check with your insurance carrier before scheduling to verify coverage.    Fred Menjivar [id number]) covered once after 65 Prevnar 13: 10/21/2020    [id number]: 07/19/2017     No recommendations at this time    Hepatitis B One screening covered for patients with certain risk factors   -  No recommendations at this time    Tetanus Toxoid Not

## 2021-10-13 NOTE — PROGRESS NOTES
HPI:   Melissa Berg is a 77year old male who presents for a Medicare Subsequent Annual Wellness visit (Pt already had Initial Annual Wellness).   Peripheral vascular disease, unspecified ([facility]  Stable, asa statin    Patent foramen ovale  Stable  [place]ise[place]da above.     Open wound of both lower extremities, subsequent encounter  Home care following closely and the wounds are healing really well. I am happy with the progress over the past week.         Annual Physical due on 10/13/2022      Fall/Risk Assessment quittin.9      Smokeless tobacco: Never Used      Tobacco comment: QUIT         CAGE screening score of 0 on 10/6/2021, showing low risk of alcohol abuse.          Patient Care Team: Patient Care Team:  Jacques Hernadez MD as PCP - Dougie Doyle [place] (4/2009, 11/2008), CVA, old, aphasia (4/2009), Edema, Essential hypertension, Factor 5 Leiden mutation, heterozygous ([facility] HTN (hypertension), Hypercholesteremia, Hyperlipidemia, PFO (patent foramen ovale), St[facility] ([facility] (4/9/2009), and Unspecified transie improved with a few days of lasix last week.   Wounds are healing well         Skin: Skin color, normal   Lymph nodes: Cervical, supraclavicular, nodes normal   Neurologic: Normal            Vaccination History     Immunization History   Administered Date(s evaluation or work up if indicated. Due for labs. Chronic anticoagulation  Coumadin  Monitored at home     At risk for falling  Currently has PT with home care. Compliance and cooperation is an issue and may be dismissed from home PT.   They are appr pre-diabetes   One screening every 6 months if diagnosed with pre-diabetes Lab Results   Component Value Date    GLU 94 10/13/2020        Cardiovascular Disease Screening    Lipid Panel  Cholesterol  Lipoprotein (HDL)  Triglycerides Covered every 5 years f recommendations at this time    Zoster Not covered by Medicare Part B; may be covered with your pharmacy  prescription benefits -  Zoster Vaccines(1 of 2) Never done        Annual Monitoring of Persistent Medications (ACE/ARB, digoxin diuretics, anticonvul

## 2021-10-14 ENCOUNTER — APPOINTMENT (OUTPATIENT)
Dept: WOUND CARE | Facility: HOSPITAL | Age: 66
End: 2021-10-14
Attending: FAMILY MEDICINE
Payer: MEDICARE

## 2021-10-14 ENCOUNTER — TELEPHONE (OUTPATIENT)
Dept: INTERNAL MEDICINE CLINIC | Facility: CLINIC | Age: 66
End: 2021-10-14

## 2021-10-14 NOTE — TELEPHONE ENCOUNTER
Paperwork was received from [facility]#[id number] and placed on SD desk for review and signature.

## 2021-10-15 ENCOUNTER — LAB REQUISITION (OUTPATIENT)
Dept: LAB | Facility: HOSPITAL | Age: 66
End: 2021-10-15
Payer: MEDICARE

## 2021-10-15 DIAGNOSIS — I10 ESSENTIAL (PRIMARY) HYPERTENSION: ICD-10-CM

## 2021-10-15 PROCEDURE — 80061 LIPID PANEL: CPT | Performed by: INTERNAL MEDICINE

## 2021-10-15 PROCEDURE — 85025 COMPLETE CBC W/AUTO DIFF WBC: CPT | Performed by: INTERNAL MEDICINE

## 2021-10-15 PROCEDURE — 80053 COMPREHEN METABOLIC PANEL: CPT | Performed by: INTERNAL MEDICINE

## 2021-10-15 PROCEDURE — 84443 ASSAY THYROID STIM HORMONE: CPT | Performed by: INTERNAL MEDICINE

## 2021-10-18 ENCOUNTER — TELEPHONE (OUTPATIENT)
Dept: INTERNAL MEDICINE CLINIC | Facility: CLINIC | Age: 66
End: 2021-10-18

## 2021-10-18 NOTE — TELEPHONE ENCOUNTER
Brooke Finley calling to see if SD will approve a home health aid 2x a week to assist pt with bathing.  Please call Brooke Finley back with approval

## 2021-10-18 NOTE — TELEPHONE ENCOUNTER
Detailed VM left for [facility] and notified of verbal ok for [place] aide. Advised to call back with any additional questions/needs.

## 2021-10-18 NOTE — TELEPHONE ENCOUNTER
Pt seen for history of stroke and behavioral issues. Pt is scheduled for 10/19/21.  Will ask provider if okay to change to virtual.

## 2021-10-18 NOTE — TELEPHONE ENCOUNTER
pt's sister Laura Medellin is calling wanting to know if the pt can have a telephone visit or virtual visit due to the pt being bed riden; pt has not been seen in the office in about a year; pls advise

## 2021-10-19 ENCOUNTER — TELEPHONE (OUTPATIENT)
Dept: PAIN CLINIC | Facility: CLINIC | Age: 66
End: 2021-10-19

## 2021-10-19 NOTE — PROGRESS NOTES
This follow up is conducted as a video visit after consent was obtained from patient and patient' POA      HISTORY OF PRESENT ILLNESS:  Marek Arroyo. is a(n) 77year old, right handed,  male who presents for spasm and tremor, agitation management.    Chester Hood baseline complete aphasia after major stroke in 2009,    not well controlled on current Seroquel dose,   Will change dose to 50 mg tid,   very limited to offer, given the history,  sister said it is impossible to do any test  In his situation, sister prefjennifer

## 2021-10-19 NOTE — TELEPHONE ENCOUNTER
Signed paperwork was faxed to [facility] fax#[phone].  Confirmation of receipt was received and paperwork was sent to scan

## 2021-10-19 NOTE — TELEPHONE ENCOUNTER
Pharmacist called requesting a call back to change instructions on QUEtiapine 25 MG Oral Tab.     Please advise

## 2021-10-20 ENCOUNTER — APPOINTMENT (OUTPATIENT)
Dept: CARDIOLOGY | Age: 66
End: 2021-10-20

## 2021-10-20 NOTE — TELEPHONE ENCOUNTER
Spoke with [facility] pharmacist who states there is a quantity limit of 4 tablets per day for Quetiapine per patient insurance. Rx Quetiapine 50mg #90, 1 tablet tid sent to [facility] per written order.

## 2021-10-21 NOTE — TELEPHONE ENCOUNTER
Signed paperwork was faxed to [facility] fax#[phone].  Confirmation of receipt was received and paperwork was sent to [facility]

## 2021-10-24 DIAGNOSIS — F69 BEHAVIOR PROBLEM, ADULT: ICD-10-CM

## 2021-10-25 ENCOUNTER — HOSPITAL ENCOUNTER (OUTPATIENT)
Facility: HOSPITAL | Age: 66
Setting detail: OBSERVATION
Discharge: HOSPICE | End: 2021-10-28
Attending: EMERGENCY MEDICINE | Admitting: INTERNAL MEDICINE
Payer: MEDICARE

## 2021-10-25 ENCOUNTER — APPOINTMENT (OUTPATIENT)
Dept: GENERAL RADIOLOGY | Facility: HOSPITAL | Age: 66
End: 2021-10-25
Attending: EMERGENCY MEDICINE
Payer: MEDICARE

## 2021-10-25 ENCOUNTER — TELEPHONE (OUTPATIENT)
Dept: INTERNAL MEDICINE CLINIC | Facility: CLINIC | Age: 66
End: 2021-10-25

## 2021-10-25 DIAGNOSIS — A41.9 SEPSIS DUE TO UNDETERMINED ORGANISM (HCC): ICD-10-CM

## 2021-10-25 DIAGNOSIS — R79.89 ELEVATED LACTIC ACID LEVEL: ICD-10-CM

## 2021-10-25 DIAGNOSIS — N28.9 RENAL INSUFFICIENCY: Primary | ICD-10-CM

## 2021-10-25 DIAGNOSIS — R00.0 SINUS TACHYCARDIA: ICD-10-CM

## 2021-10-25 DIAGNOSIS — R09.02 HYPOXIA: ICD-10-CM

## 2021-10-25 DIAGNOSIS — J18.9 COMMUNITY ACQUIRED PNEUMONIA OF LEFT LUNG, UNSPECIFIED PART OF LUNG: ICD-10-CM

## 2021-10-25 PROCEDURE — 99220 INITIAL OBSERVATION CARE,LEVL III: CPT | Performed by: INTERNAL MEDICINE

## 2021-10-25 PROCEDURE — 71045 X-RAY EXAM CHEST 1 VIEW: CPT | Performed by: EMERGENCY MEDICINE

## 2021-10-25 RX ORDER — AZITHROMYCIN 250 MG/1
500 TABLET, FILM COATED ORAL EVERY 24 HOURS
Status: DISCONTINUED | OUTPATIENT
Start: 2021-10-26 | End: 2021-10-26

## 2021-10-25 RX ORDER — SODIUM CHLORIDE, SODIUM LACTATE, POTASSIUM CHLORIDE, CALCIUM CHLORIDE 600; 310; 30; 20 MG/100ML; MG/100ML; MG/100ML; MG/100ML
INJECTION, SOLUTION INTRAVENOUS CONTINUOUS
Status: DISCONTINUED | OUTPATIENT
Start: 2021-10-25 | End: 2021-10-26

## 2021-10-25 RX ORDER — ONDANSETRON 2 MG/ML
4 INJECTION INTRAMUSCULAR; INTRAVENOUS EVERY 6 HOURS PRN
Status: DISCONTINUED | OUTPATIENT
Start: 2021-10-25 | End: 2021-10-28

## 2021-10-25 RX ORDER — MELATONIN
3 NIGHTLY PRN
Status: DISCONTINUED | OUTPATIENT
Start: 2021-10-25 | End: 2021-10-28

## 2021-10-25 RX ORDER — SIMVASTATIN 20 MG
20 TABLET ORAL NIGHTLY
COMMUNITY

## 2021-10-25 RX ORDER — FAMOTIDINE 20 MG/1
20 TABLET ORAL 2 TIMES DAILY
COMMUNITY

## 2021-10-25 RX ORDER — ACETAMINOPHEN 325 MG/1
650 TABLET ORAL EVERY 6 HOURS PRN
Status: DISCONTINUED | OUTPATIENT
Start: 2021-10-25 | End: 2021-10-28

## 2021-10-25 RX ORDER — BISACODYL 10 MG
10 SUPPOSITORY, RECTAL RECTAL
Status: DISCONTINUED | OUTPATIENT
Start: 2021-10-25 | End: 2021-10-28

## 2021-10-25 RX ORDER — TIZANIDINE 4 MG/1
8 TABLET ORAL 2 TIMES DAILY
Qty: 360 TABLET | Refills: 3 | OUTPATIENT
Start: 2021-10-25

## 2021-10-25 RX ORDER — MULTIVITAMIN WITH FOLIC ACID 400 MCG
1 TABLET ORAL DAILY
COMMUNITY

## 2021-10-25 RX ORDER — SODIUM PHOSPHATE, DIBASIC AND SODIUM PHOSPHATE, MONOBASIC 7; 19 G/133ML; G/133ML
1 ENEMA RECTAL ONCE AS NEEDED
Status: DISCONTINUED | OUTPATIENT
Start: 2021-10-25 | End: 2021-10-28

## 2021-10-25 RX ORDER — AZITHROMYCIN 250 MG/1
500 TABLET, FILM COATED ORAL
Status: DISCONTINUED | OUTPATIENT
Start: 2021-10-26 | End: 2021-10-25 | Stop reason: SDUPTHER

## 2021-10-25 RX ORDER — POLYETHYLENE GLYCOL 3350 17 G/17G
17 POWDER, FOR SOLUTION ORAL DAILY PRN
Status: DISCONTINUED | OUTPATIENT
Start: 2021-10-25 | End: 2021-10-28

## 2021-10-25 RX ORDER — LISINOPRIL 20 MG/1
20 TABLET ORAL DAILY
COMMUNITY

## 2021-10-25 RX ORDER — AZITHROMYCIN 250 MG/1
500 TABLET, FILM COATED ORAL
Status: DISCONTINUED | OUTPATIENT
Start: 2021-10-25 | End: 2021-10-25

## 2021-10-25 NOTE — ED PROVIDER NOTES
Patient Seen in: [facility] Emergency Department      History   Patient presents with:  Difficulty Breathing    Stated Complaint: sob    Subjective:   HPI    The patient is a 15-year-old nonverbal male due to previous CVA, with multiple past medical Pulse (!) 127   Temp 97.7 °F (36.5 °C) (Temporal)   Resp (!) 31   Wt 108 kg   SpO2 93%   BMI 37.29 kg/m²         Physical Exam    General: Overweight nonverbal elderly appearing adult male resting the stretcher, alert, eyes open, moving all 4 extremities. PLASMA - Abnormal; Notable for the following components:    Lactic Acid 2.7 (*)     All other components within normal limits   URINALYSIS WITH CULTURE REFLEX - Abnormal; Notable for the following components:    Clarity Urine Hazy (*)     Blood Urine Small tachycardia. The patient was placed on continuous pulse oximetry and cardiac telemetry. He was hypertensive here, however he is moving around fairly uncooperative with checking vital signs.   And per discussion with paramedics, their blood pressure was 10 telemetry. I discussed the patient [facility] and he was admitted in stable condition.         Admission disposition: 10/25/2021  8:09 PM                          Disposition and Plan     Clinical Impression:  Renal insufficiency

## 2021-10-25 NOTE — ED INITIAL ASSESSMENT (HPI)
Per EMS patient from home non verbal, hx of stroke. Per EMS home health nurse said his oxygen levels were low 88% at home.

## 2021-10-25 NOTE — TELEPHONE ENCOUNTER
LM to verify which pharmacy the patient is using.        Medication: TIZANIDINE 4 MG Oral Tab    Date of last refill: 10/19/2021 (#360/3)  [place]    Last office visit: 10/19/2021  Due back to clinic per last office n

## 2021-10-25 NOTE — TELEPHONE ENCOUNTER
Stephon Hadley RN [facility] indicates this am when she was there for wound assessment, pt's vitals were ok but his O2 Sat was 91%, states he had a recent fall and now patient will not get out of bed at all, 2 new pressure ulcers,one on the side of his right foot, robin

## 2021-10-26 ENCOUNTER — TELEPHONE (OUTPATIENT)
Dept: INTERNAL MEDICINE CLINIC | Facility: CLINIC | Age: 66
End: 2021-10-26

## 2021-10-26 PROCEDURE — 99225 SUBSEQUENT OBSERVATION CARE: CPT | Performed by: INTERNAL MEDICINE

## 2021-10-26 RX ORDER — TIZANIDINE 2 MG/1
8 TABLET ORAL 2 TIMES DAILY
Status: DISCONTINUED | OUTPATIENT
Start: 2021-10-26 | End: 2021-10-26

## 2021-10-26 RX ORDER — METHYLPREDNISOLONE SODIUM SUCCINATE 125 MG/2ML
125 INJECTION, POWDER, LYOPHILIZED, FOR SOLUTION INTRAMUSCULAR; INTRAVENOUS ONCE
Status: COMPLETED | OUTPATIENT
Start: 2021-10-26 | End: 2021-10-26

## 2021-10-26 RX ORDER — FAMOTIDINE 20 MG/1
20 TABLET ORAL DAILY
Status: DISCONTINUED | OUTPATIENT
Start: 2021-10-26 | End: 2021-10-28

## 2021-10-26 RX ORDER — ATORVASTATIN CALCIUM 10 MG/1
10 TABLET, FILM COATED ORAL NIGHTLY
Status: DISCONTINUED | OUTPATIENT
Start: 2021-10-26 | End: 2021-10-28

## 2021-10-26 RX ORDER — QUETIAPINE 50 MG/1
50 TABLET, FILM COATED ORAL 3 TIMES DAILY
Status: DISCONTINUED | OUTPATIENT
Start: 2021-10-26 | End: 2021-10-28

## 2021-10-26 RX ORDER — IPRATROPIUM BROMIDE AND ALBUTEROL SULFATE 2.5; .5 MG/3ML; MG/3ML
3 SOLUTION RESPIRATORY (INHALATION)
Status: DISCONTINUED | OUTPATIENT
Start: 2021-10-26 | End: 2021-10-28

## 2021-10-26 RX ORDER — METOPROLOL SUCCINATE 25 MG/1
25 TABLET, EXTENDED RELEASE ORAL
Status: DISCONTINUED | OUTPATIENT
Start: 2021-10-26 | End: 2021-10-28

## 2021-10-26 RX ORDER — ASPIRIN 81 MG/1
81 TABLET ORAL DAILY
Status: DISCONTINUED | OUTPATIENT
Start: 2021-10-26 | End: 2021-10-28

## 2021-10-26 RX ORDER — LISINOPRIL 20 MG/1
20 TABLET ORAL DAILY
Status: DISCONTINUED | OUTPATIENT
Start: 2021-10-26 | End: 2021-10-28

## 2021-10-26 RX ORDER — WARFARIN SODIUM 2 MG/1
4 TABLET ORAL NIGHTLY
Status: DISCONTINUED | OUTPATIENT
Start: 2021-10-26 | End: 2021-10-28

## 2021-10-26 RX ORDER — PREDNISONE 20 MG/1
20 TABLET ORAL 2 TIMES DAILY WITH MEALS
Status: DISCONTINUED | OUTPATIENT
Start: 2021-10-26 | End: 2021-10-28

## 2021-10-26 NOTE — CM/SW NOTE
SW received referral , eula france .  Called sister Kamlesh Ferrara and she scheduled meeting for 10:30am tomorrow meeting

## 2021-10-26 NOTE — CONSULTS
[facility] Pharmacy Note: Antimicrobial Weight Based Dose Adjustment for: ceftriaxone (ROCEPHIN)    Glenroy Dunlap. is a 77year old patient who has been prescribed ceftriaxone (ROCEPHIN) 1 gm x 1 dose. CrCl cannot be calculated (Unknown ideal weight.).   Wt

## 2021-10-26 NOTE — PROGRESS NOTES
NURSING ADMISSION NOTE      Patient admitted via Cart  Oriented to room. Safety precautions initiated. Bed in low position. Call light in reach. Admission/medlist completed to best ability. Pt is nonverbal at baseline due to hx of CVA. Room air.

## 2021-10-26 NOTE — PLAN OF CARE
Pt Aox1. Nonverbal. Mumbles at times. Tremors at baseline. on RA. Po steroids. Hypotensive this afternoon, bolus given. Tele, ST. On coumadin. Briefed/incontinent. No pain. Total lift. PT/OT following. Pending wound care eval. Saline locked.  Reg diet, tole Free from fall injury  Description: INTERVENTIONS:  - Assess pt frequently for physical needs  - Identify cognitive and physical deficits and behaviors that affect risk of falls.   - [place] fall precautions as indicated by assessment.  - Educate pt/famil Progressing  Goal: Electrolytes maintained within normal limits  Description: INTERVENTIONS:  - Monitor labs and rhythm and assess patient for signs and symptoms of electrolyte imbalances  - Administer electrolyte replacement as ordered  - Monitor response INTERVENTIONS  - Assess oral mucosa and hygiene practices  - Implement preventative oral hygiene regimen  - Implement oral medicated treatments as ordered  Outcome: Progressing

## 2021-10-26 NOTE — PROGRESS NOTES
[facility] Pharmacy Note: Antimicrobial Weight Based Dose Adjustment for: ceftriaxone (ROCEPHIN)    Andrés Barrios. is a 77year old patient who has been prescribed ceftriaxone (ROCEPHIN) 1 gm every 24 hours.       Estimated Creatinine Clearance: 36 mL/min (A)

## 2021-10-26 NOTE — PHYSICAL THERAPY NOTE
PT orders recd, chart reviewed, spoke with rn, Patricio Campos. Attempts to reach family by phone unsuccessful. Pt is non verbal with h/o CVA. Pt presents in semi side lying position, bilat LE held in significant flexion.   With time and distraction, able to m

## 2021-10-26 NOTE — CONSULTS
[facility]  Report of Inpatient Wound Care Consultation    [place] Jennifer.  Patient Status:  Observation    1955 MRN [id number]   [facility] 5NW-A Attending Waqar Sam, DO   Hosp Day # 0 PCP Eileen Brady MD     Reason for 0011   Present on Hospital Admission: Yes  Location: Heel  Wound Location Orientation: Left      Assessments 10/26/2021  4:43 PM   Wound Image     Drainage Amount Moderate   Drainage Description Serosanguineous   Wound Length (cm) 8 cm   Wound Width (cm) 8 pale in color, temperature within normal limits    Wound Cleaning and Dressings: 1. Left heel  Showering directions: May shower with protection  Wound cleaning frequency: Daily  Wound product: Honey gel,ABD pad.wrap with rolled gauze  Dressing change freq

## 2021-10-26 NOTE — CM/SW NOTE
Case discussed in rounds. Pt's sister is asking about hospice care. SW s/w pt's sister Yuliet Lovell who reports that pt has h/o cva and has severe cognitive delays. Per sister pt has declined in the last few weeks and has had some agitation at home.  Pt cared for

## 2021-10-26 NOTE — TELEPHONE ENCOUNTER
Order from [facility] for an air mattress was placed on SD desk for review and signature.

## 2021-10-26 NOTE — ED QUICK NOTES
Orders for admission, patient is aware of plan and ready to go upstairs. Any questions, please call ED RN Dilip Rivers at extension [phone]. Vaccinated?   Type of COVID test sent:  COVID Suspicion level: Low/High  Low    Titratable drug(s) infusing:  Rate:  Azit

## 2021-10-26 NOTE — PROGRESS NOTES
[facility]     Hospitalist Progress Note     Yovanny Valenzuela.  Patient Status:  Inpatient    1955 MRN [id number]   [facility] 5NW-A Attending Lavinia De León, DO   Hosp Day # 1 PCP Kady Samuels MD     Chief Complaint: Hypoxia hours.    Inflammatory Markers  No results for input(s): CRP, BENEDICTO, LDH, DDIMER in the last 168 hours. Imaging: Imaging data reviewed in Epic.     Medications:   • aspirin  81 mg Oral Daily   • famotidine  20 mg Oral Daily   • metoprolol succinate  25 mg the clinical documentation in H+P. Based on patients current state of illness, I anticipate that, after discharge, patient will require TBD.

## 2021-10-26 NOTE — PROGRESS NOTES
[facility] Pharmacy Note:  Renal Dose Adjustment    Eugene Ruiz. has been prescribed famotidine (PEPCID) 20 mg orally BID. Estimated Creatinine Clearance: 36 mL/min (A) (based on SCr of 1.82 mg/dL (H)).     Calculated creatinine clearance is < 50 ml/min,

## 2021-10-26 NOTE — PLAN OF CARE
Pt is nonverbal at baseline due to hx of CVA. Impulsive at times, but can be redirected. Room air, o2 >95%. Non-productive cough noted. Tele, ST. Afebrile. IV fluids. IV abx. Warfarin. Incontinent x2. Briefed and male [address] in place.  No signs of pain, s anticipated neutropenic period  Description: INTERVENTIONS  - Monitor WBC  - Administer growth factors as ordered  - Implement neutropenic guidelines  Outcome: Progressing     Problem: SAFETY ADULT - FALL  Goal: Free from fall injury  Description: Felisa Sneed ordered medications to maintain glucose within target range  - Assess barriers to adequate nutritional intake and initiate nutrition consult as needed  - Instruct patient on self management of diabetes  Outcome: Progressing  Goal: Electrolytes maintained w care per orders  - Initiate isolation precautions as appropriate  - Initiate Pressure Ulcer prevention bundle as indicated  Outcome: Progressing  Goal: Oral mucous membranes remain intact  Description: INTERVENTIONS  - Assess oral mucosa and hygiene practi

## 2021-10-26 NOTE — H&P
[place] HOSPITALIST  History and Physical     Margareth Naidu.  Patient Status:  Emergency    1955 MRN [id number]   Location [facility] Attending WillRajani MD   Hosp Day # 0 PCP Abdon Ponce MD     Chief Compla 50 MG Oral Tab, Take 1 tablet (50 mg total) by mouth 3 (three) times daily. , Disp: 90 tablet, Rfl: 11  QUEtiapine 25 MG Oral Tab, Take 2 tablets (50 mg total) by mouth 3 (three) times daily. , Disp: 180 tablet, Rfl: 11  tiZANidine 4 MG Oral Tab, Take 2 tabl LLE with bandage c/d/i, no e/o cellulitis or wounds  Psychiatric: Appropriate mood and affect.       Diagnostic Data:      Labs:  Recent Labs   Lab 10/25/21  1637   WBC 12.5*   HGB 11.8*   MCV 95.3   .0   INR 2.25*       Recent Labs   Lab 10/25/21  1 Plan of care discussed with pt, ROMMEL Carcamo,   10/25/2021

## 2021-10-27 PROCEDURE — 99225 SUBSEQUENT OBSERVATION CARE: CPT | Performed by: INTERNAL MEDICINE

## 2021-10-27 NOTE — DIETARY NOTE
[address] [place] ASSESSMENT-Brief     Pt does not meet malnutrition criteria. PATIENT STATUS: Pt was seen today due to wound alert. Pt and family has decided to pursue hospice care. RD will sign-off; available for consult if needed.       L

## 2021-10-27 NOTE — OCCUPATIONAL THERAPY NOTE
OCCUPATIONAL THERAPY QUICK EVALUATION - INPATIENT    Room Number: 295/679-T  Evaluation Date: 10/27/2021     Type of Evaluation: Quick Eval  Presenting Problem: Left heel wound, renal insufficiency    Physician Order: IP Consult to Occupational Therapy  Re self-stated goal is n/a    OBJECTIVE  Precautions: Bed/chair alarm (wounds)  Fall Risk: High fall risk    WEIGHT BEARING RESTRICTION  Weight Bearing Restriction: None                PAIN ASSESSMENT  Rating: Unable to rate          COGNITION  Alert, inconsi mother, however unable to reach. Discussed findings w/ SW and PT. Noted in previous doctor's office notes that sister was inquiring about hospice care. SW to contact family.       Patient End of Session: With [facility] staff;Needs met;Call light within reach;RN level  Patient/Caregiver able to demonstrate safety with ADLS: at previous functional level

## 2021-10-27 NOTE — PROGRESS NOTES
[facility]     Hospitalist Progress Note     Glenroy Dunlap.  Patient Status:  Inpatient    1955 MRN [id number]   [facility] 5NW-A Attending Aisha Aden DO   Hosp Day # 0 PCP Truman Schmitt MD     Chief Complaint: Hypoxia hours.    Inflammatory Markers  Recent Labs   Lab 10/26/21  0749   BENEDICTO 398.5       Imaging: Imaging data reviewed in Epic.     Medications:   • aspirin  81 mg Oral Daily   • famotidine  20 mg Oral Daily   • metoprolol succinate  25 mg Oral Daily Beta Blocke

## 2021-10-27 NOTE — CM/SW NOTE
SW met with pts sister Enrique Kerr who is also his guardian. Discussed hospice services and goals of care. Sister wanting to move forward with hospice. Plan for pt to go home tomorrow with hospice .   by ambulance at 2:30pm

## 2021-10-27 NOTE — PLAN OF CARE
Pt is nonverbal at baseline due to hx of CVA but mumbles at times. Room air, o2 >90%. Non-productive cough noted. Scheduled nebs. Tele, ST. Max temp off 100.6 overnight, prn tylenol given. Warfarin. Incontinent x2. Briefed and male [address] in place.  No si Monitor WBC  - Administer growth factors as ordered  - Implement neutropenic guidelines  Outcome: Progressing     Problem: SAFETY ADULT - FALL  Goal: Free from fall injury  Description: INTERVENTIONS:  - Assess pt frequently for physical needs  - Identify Assess barriers to adequate nutritional intake and initiate nutrition consult as needed  - Instruct patient on self management of diabetes  Outcome: Progressing  Goal: Electrolytes maintained within normal limits  Description: INTERVENTIONS:  - Monitor lab appropriate  - Initiate Pressure Ulcer prevention bundle as indicated  Outcome: Progressing  Goal: Oral mucous membranes remain intact  Description: INTERVENTIONS  - Assess oral mucosa and hygiene practices  - Implement preventative oral hygiene regimen  - no radiation

## 2021-10-28 VITALS
DIASTOLIC BLOOD PRESSURE: 46 MMHG | TEMPERATURE: 100 F | RESPIRATION RATE: 17 BRPM | HEIGHT: 66 IN | WEIGHT: 239 LBS | SYSTOLIC BLOOD PRESSURE: 97 MMHG | BODY MASS INDEX: 38.41 KG/M2 | HEART RATE: 91 BPM | OXYGEN SATURATION: 90 %

## 2021-10-28 PROCEDURE — 99217 OBSERVATION CARE DISCHARGE: CPT | Performed by: HOSPITALIST

## 2021-10-28 RX ORDER — PREDNISONE 10 MG/1
TABLET ORAL
Qty: 30 TABLET | Refills: 0 | Status: SHIPPED | OUTPATIENT
Start: 2021-10-28

## 2021-10-28 NOTE — PLAN OF CARE
Pt is nonverbal at baseline due to hx of CVA but mumbles at times. Room air, o2 >90%. 1L PRN while sleeping. Non-productive cough noted. Scheduled nebs. Tele, ST. Afebrile. Coumadin. Incontinent x2. Briefed and male [address] in place.  No signs of pain, sob Absence of fever/infection during anticipated neutropenic period  Description: INTERVENTIONS  - Monitor WBC  - Administer growth factors as ordered  - Implement neutropenic guidelines  Outcome: Progressing     Problem: SAFETY ADULT - FALL  Goal: Free from and hypoglycemia  - Administer ordered medications to maintain glucose within target range  - Assess barriers to adequate nutritional intake and initiate nutrition consult as needed  - Instruct patient on self management of diabetes  Outcome: Progressing surrounding tissue  - Implement wound care per orders  - Initiate isolation precautions as appropriate  - Initiate Pressure Ulcer prevention bundle as indicated  Outcome: Progressing  Goal: Oral mucous membranes remain intact  Description: INTERVENTIONS  -

## 2021-10-28 NOTE — TELEPHONE ENCOUNTER
Signed paperwork was faxed to [facility]. Confirmation of receipt was received and paperwork was sent to scan.

## 2021-10-28 NOTE — PLAN OF CARE
Pt is alert/nonverbal. VSS. Maintaining O2 sat WNL on room air. ST on tele. T-max 100.1, tylenol and ice packs given, recheck 99.9. last BM 10/26. Briefed and incontinent. Total lift. Tolerating diet, great appetite, 1:1 feeder. Bilateral bunny boots.  Dres INTERVENTIONS  - Monitor WBC  - Administer growth factors as ordered  - Implement neutropenic guidelines  Outcome: Progressing     Problem: SAFETY ADULT - FALL  Goal: Free from fall injury  Description: INTERVENTIONS:  - Assess pt frequently for physical n target range  - Assess barriers to adequate nutritional intake and initiate nutrition consult as needed  - Instruct patient on self management of diabetes  Outcome: Progressing  Goal: Electrolytes maintained within normal limits  Description: INTERVENTIONS precautions as appropriate  - Initiate Pressure Ulcer prevention bundle as indicated  Outcome: Progressing  Goal: Oral mucous membranes remain intact  Description: INTERVENTIONS  - Assess oral mucosa and hygiene practices  - Implement preventative oral hyg

## 2021-10-28 NOTE — PROGRESS NOTES
[facility]     Hospitalist Progress Note     Nayeli Waller.  Patient Status:  Inpatient    1955 MRN [id number]   [facility] 5NW-A Attending Yaw Christianson,    Hosp Day # 0 PCP Lorin Christianson MD     Chief Complaint: Hypoxia Markers  Recent Labs   Lab 10/26/21  0749   BENEDICTO 398.5       Imaging: Imaging data reviewed in Epic.     Medications:   • aspirin  81 mg Oral Daily   • famotidine  20 mg Oral Daily   • metoprolol succinate  25 mg Oral Daily Beta Blocker   • QUEtiapine  50 mg

## 2021-10-28 NOTE — PROGRESS NOTES
NURSING DISCHARGE NOTE    Discharged Home via Ambulance. Accompanied by Support staff  Belongings Taken by patient/family. Patient discharging. Wound care supplies sent with the patient. Dressings changed prior to DC.    Polst Signed and copied an

## 2021-10-28 NOTE — HOSPICE RN NOTE
Pt will d/c to home today where hospice will be initiated. [place] ambulance set for  at 2pm.  POC reviewed with ANABELA Altman including request that wound supplies be sent to cover until hospice supplies arrive to their home.

## 2021-10-29 NOTE — DISCHARGE SUMMARY
[facility] HOSPITALIST  DISCHARGE SUMMARY     Ara La.  Patient Status:  Observation    1955 MRN [id number]   [facility] 5NW-A Attending No att. providers found   Hosp Day # 0 PCP Armando Rivera MD     Date of Admission: 10/25/2 tablet  Refills: 0        CONTINUE taking these medications      Instructions Prescription details   aspirin 81 MG Tbec      Take 81 mg by mouth daily.    Refills: 0     famotidine 20 MG Tabs  Commonly known as: PEPCID      Take 20 mg by mouth 2 (two) times extremities. Extremities: No edema.   -----------------------------------------------------------------------------------------------  PATIENT DISCHARGE INSTRUCTIONS: See electronic chart    Rafael Awad MD    Time spent:  > 30 minutes

## 2022-03-15 ENCOUNTER — TELEPHONE (OUTPATIENT)
Dept: INTERNAL MEDICINE CLINIC | Facility: CLINIC | Age: 67
End: 2022-03-15

## 2022-03-15 NOTE — TELEPHONE ENCOUNTER
Patient is due for colonoscopy/FIT    Pended  FIT. Patient did FIT in 2020, ok to order FIT?     Last Office Visit:10--sd-AWV    Last Physical:10--sd-AWV    Care Caps:  Shingles Vaccine  Flu Shot  Colonoscopy  Pneumonia Vaccine

## 2022-03-22 ENCOUNTER — TELEPHONE (OUTPATIENT)
Dept: INTERNAL MEDICINE CLINIC | Facility: CLINIC | Age: 67
End: 2022-03-22

## 2022-04-06 ENCOUNTER — TELEPHONE (OUTPATIENT)
Dept: INTERNAL MEDICINE CLINIC | Facility: CLINIC | Age: 67
End: 2022-04-06

## 2022-04-06 NOTE — TELEPHONE ENCOUNTER
Pt's sister Keyana Alvarado had called to schedule her mom's AWV but also wanted to let SD know that pt passed away 2-5-22 in hospice.

## 2023-01-20 NOTE — TELEPHONE ENCOUNTER
Pt had a major stroke several years ago pt fell  a few weeks ago and his oxygen is in the low 80's. Calling to speak to the nurse. Yes

## (undated) DIAGNOSIS — Z12.11 SCREEN FOR COLON CANCER: Primary | ICD-10-CM

## (undated) NOTE — IP AVS SNAPSHOT
Patient Demographics     Address  [address]  [place] [address] Phone  [phone] (Home) *Preferred*  [phone] [facility]) E-mail Address  [email or web address]      Emergency Contact(s)     Name Relation Home Work [facility] Morning      Take 1 tablet by mouth daily. tiZANidine 4 MG Tabs  Commonly known as: Epimenio Loveless  Next dose due: 10/28 Bedtime      Take 2 tablets (8 mg total) by mouth 2 (two) times daily.    Félix Shabazz MD         warfarin 4 MG Tabs  Commonly known as: 10/28/2021 0513   Pulse 91 Filed at 10/28/2021 0513   Resp 17 Filed at 10/28/2021 1144   Temp 99.6 °F (37.6 °C) Filed at 10/28/2021 1144   SpO2 90 % Filed at 10/28/2021 0513      Patient's Most Recent Weight       Most Recent Value   Patient Weight 108.4 k including nucleic acid from Severe Acute Respiratory Syndrome Coronavirus 2 (SARS-CoV-2) in nasopharyngeal swab from individuals suspected of respiratory viral infection consistent with COVID-19 by their healthcare provider.     Test performed using the [facility] and chart review. He was sent to ED from home by EMS after [facility] RN found patient to be hypoxic to 88% on RA at home. No recent fevers/chills, coughing, abdominal pain, n/v/d, recent sick contacts.      Past Medical History:  Past Medical History:   Diagnos 0  SIMVASTATIN 20 MG Oral Tab, TAKE ONE TABLET BY MOUTH ONE TIME DAILY IN THE EVENING., Disp: 90 tablet, Rfl: 0  aspirin 81 MG Oral Tab, Take 81 mg by mouth daily. , Disp: , Rfl:   Metoprolol Succinate ER 25 MG Oral Tablet 24 Hr, Take 1 tablet by mouth rafa 25.4*   INR 2.25*       COVID-19 Lab Results    COVID-19  Lab Results   Component Value Date    COVID19 Not Detected 10/25/2021       Pro-Calcitonin  No results for input(s): PCT in the last 168 hours.     Cardiac  Recent Labs   Lab 10/25/21  1637   TROP <0 12:36 AM         [facility]  History and Physical     Rikki Lakhani.  Patient Status:  Emergency    1955 MRN [id number]   Location [address] [facility] Attending Ngoc Weinstein MD   Hosp Day # 0 PCP Allyson Graves MD encounter. QUEtiapine 50 MG Oral Tab, Take 1 tablet (50 mg total) by mouth 3 (three) times daily. , Disp: 90 tablet, Rfl: 11  QUEtiapine 25 MG Oral Tab, Take 2 tablets (50 mg total) by mouth 3 (three) times daily. , Disp: 180 tablet, Rfl: 11  tiZANidine 4 M cyanosis. Integument: LLE with bandage c/d/i, no e/o cellulitis or wounds  Psychiatric: Appropriate mood and affect.       Diagnostic Data:      Labs:  Recent Labs   Lab 10/25/21  1637   WBC 12.5*   HGB 11.8*   MCV 95.3   .0   INR 2.25*       Recent Nicole Washburn DO  10/25/2021                        Electronically signed by Sadiq Negron DO on 10/25/2021 10:55 PM           D/C Summary    No notes of this type exist for this encounter.         Physical Therapy Notes (last 72 hours)      Physical Therap (Occupational Therapist)    Related Notes: Original Note by Zhen Gaspar OT (Occupational Therapist) filed at 10/27/2021  7:02 AM       OCCUPATIONAL THERAPY QUICK EVALUATION - INPATIENT    Room Number: 240/800-W  Evaluation Date: 10/27/2021     Type of since. Patient's sister also reports he has become agitated and combative.     Patient does have [facility] RN     SUBJECTIVE   n/a    Patient self-stated goal is n/a    OBJECTIVE  Precautions: Bed/chair alarm (wounds)  Fall Risk: High fall risk    WEIGHT BEARING wounds that are currently wrapped during session. Wound care consult noted to address left heel. Attempted to contact sister and mother, however unable to reach. Discussed findings w/ SW and PT.   Noted in previous doctor's office notes that sister w able to toilet transfer: at previous functional level  Patient able to dress lower extremities: at previous functional level  Patient/Caregiver able to demonstrate safety with ADLS: at previous functional level       Occupational Therapy Note signed by Tolu Vargas History  Past Surgical History:   Procedure Laterality Date   • APPENDECTOMY     • APPENDECTOMY  1980's   • OTHER SURGICAL HISTORY      Suture of gastric ulcer   • OTHER SURGICAL HISTORY  4/19/2009    Venous thrombectomy       OCCUPATIONAL PROFILE    HOME Dependent assistance  Sit to Stand: Dependent assistance    Skilled Therapy Provided:   Patient does not respond to any motor commands despite max visual cues or hand-over-hand cueing to initiate.   Patient is easily startled with attempt at repositioning, problem-focused assessments, limited treatment options    Overall Complexity  LOW     OT Discharge Recommendations: 24 hour care/supervision  OT Device Recommendations: TBD (unsure what patient has at home)    PLAN   Patient has been evaluated and presents - amcrest camera in room, bed alarm on, frequent monitoring    - See additional Care Plan goals for specific interventions

## (undated) NOTE — LETTER
04/26/19        Adrienne Arnett [facility] [address]      Dear Luisa Pollock records indicate that you have outstanding lab work and or testing that was ordered for you and has not yet been completed:  Orders Placed This Encounter

## (undated) NOTE — LETTER
08/22/17        Cindy Arnett [facility] [address]      Dear Ralph Bosworth records indicate that you have outstanding lab work and or testing that was ordered for you and has not yet been completed:    Occult Blood, Fecal, Immunoas

## (undated) NOTE — IP AVS SNAPSHOT
[address]            (For Outpatient Use Only) Initial Admit Date: 10/25/2021   Inpt/Obs Admit Date: Inpt: N/A / Obs: 10/25/21   Discharge Date:    Joaquín Binet:  [de-identified]   MRN: [de-identified]   CSN: [id number]   CEID: [id number] ID:  Pt Rel to Subscriber:    Hospital Account Financial Class: Medicare    October 28, 2021

## (undated) NOTE — LETTER
09/20/18        Rhonda Arnett [facility] [address]      Dear Samantha Going records indicate that you have outstanding FASTING lab work and or testing that was ordered for you and has not yet been completed:  Orders Placed This [place]